# Patient Record
Sex: FEMALE | Race: WHITE | NOT HISPANIC OR LATINO | Employment: UNEMPLOYED | URBAN - METROPOLITAN AREA
[De-identification: names, ages, dates, MRNs, and addresses within clinical notes are randomized per-mention and may not be internally consistent; named-entity substitution may affect disease eponyms.]

---
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
## 2024-01-22 ENCOUNTER — OFFICE VISIT (OUTPATIENT)
Dept: OBGYN CLINIC | Facility: MEDICAL CENTER | Age: 19
End: 2024-01-22
Payer: COMMERCIAL

## 2024-01-22 VITALS
DIASTOLIC BLOOD PRESSURE: 72 MMHG | WEIGHT: 146 LBS | HEIGHT: 66 IN | HEART RATE: 88 BPM | BODY MASS INDEX: 23.46 KG/M2 | SYSTOLIC BLOOD PRESSURE: 112 MMHG

## 2024-01-22 DIAGNOSIS — M24.812 INTERNAL DERANGEMENT OF LEFT SHOULDER: Primary | ICD-10-CM

## 2024-01-22 DIAGNOSIS — S43.432D TEAR OF LEFT GLENOID LABRUM, SUBSEQUENT ENCOUNTER: ICD-10-CM

## 2024-01-22 PROCEDURE — 99214 OFFICE O/P EST MOD 30 MIN: CPT | Performed by: ORTHOPAEDIC SURGERY

## 2024-01-22 RX ORDER — TRANEXAMIC ACID 10 MG/ML
1000 INJECTION, SOLUTION INTRAVENOUS ONCE
Status: CANCELLED | OUTPATIENT
Start: 2024-02-02 | End: 2024-01-22

## 2024-01-22 RX ORDER — CHLORHEXIDINE GLUCONATE 4 G/100ML
SOLUTION TOPICAL DAILY PRN
Status: CANCELLED | OUTPATIENT
Start: 2024-01-22

## 2024-01-22 RX ORDER — CEFAZOLIN SODIUM 1 G/50ML
1000 SOLUTION INTRAVENOUS ONCE
Status: CANCELLED | OUTPATIENT
Start: 2024-02-02 | End: 2024-01-22

## 2024-01-22 RX ORDER — CHLORHEXIDINE GLUCONATE ORAL RINSE 1.2 MG/ML
15 SOLUTION DENTAL ONCE
Status: CANCELLED | OUTPATIENT
Start: 2024-01-22 | End: 2024-01-22

## 2024-01-22 NOTE — PROGRESS NOTES
[facility]IC CARE SPECIALISTS [place]  [address]  [place] [address]   ORTHOPAEDIC SURGERY OUTPATIENT NOTE  1/22/2024      HISTORY:  18 y.o. female  presents for follow up on her left shoulder. When we last saw her we recommended an MRI arthrogram to evaluate for labral tear. Her pain continues with anterior shoulder pain when pulling, no pain with overhead lifting. She did perform 6 weeks of directed PT without relief.     Past Medical History:   Diagnosis Date    Headache     Ovarian cyst        Past Surgical History:   Procedure Laterality Date    FL INJECTION LEFT SHOULDER (ARTHROGRAM)  12/27/2023    NO PAST SURGERIES      WISDOM TOOTH EXTRACTION Bilateral     bottom wisdom teeth removed       Social History     Socioeconomic History    Marital status: Single     Spouse name: Not on file    Number of children: Not on file    Years of education: Not on file    Highest education level: Not on file   Occupational History    Not on file   Tobacco Use    Smoking status: Never     Passive exposure: Never    Smokeless tobacco: Never   Vaping Use    Vaping status: Never Used   Substance and Sexual Activity    Alcohol use: Not Currently    Drug use: Never    Sexual activity: Never     Partners: Male   Other Topics Concern    Not on file   Social History Narrative    Not on file     Social Determinants of Health     Financial Resource Strain: Not on file   Food Insecurity: Not on file   Transportation Needs: Not on file   Physical Activity: Not on file   Stress: Not on file   Social Connections: Not on file   Intimate Partner Violence: Not on file   Housing Stability: Not on file       Family History   Problem Relation Age of Onset    Anxiety disorder Mother     Endometriosis Mother     Migraines Mother     Seizures Mother     Chiari malformation Mother     No Known Problems Father     Anxiety disorder Sister     Irritable bowel syndrome Sister     Diabetes Maternal  "Grandmother     Hypertension Maternal Grandfather     Diabetes Paternal Grandmother     Mental illness Neg Hx     Addiction problem Neg Hx         Patient's Medications    No medications on file       Allergies   Allergen Reactions    Procaine Rash        /72 (BP Location: Left arm, Patient Position: Sitting, Cuff Size: Standard)   Pulse 88   Ht 5' 6\" (1.676 m)   Wt 66.2 kg (146 lb)   BMI 23.57 kg/m²      REVIEW OF SYSTEMS:  Constitutional: Negative.    HEENT: Negative.    Respiratory: Negative.    Skin: Negative.    Neurological: Negative.    Psychiatric/Behavioral: Negative.  Musculoskeletal: Negative except for that mentioned in the HPI.    /72 (BP Location: Left arm, Patient Position: Sitting, Cuff Size: Standard)   Pulse 88   Ht 5' 6\" (1.676 m)   Wt 66.2 kg (146 lb)   BMI 23.57 kg/m²   Gen: No acute distress, resting comfortably in bed  HEENT: Eyes clear, moist mucus membranes, hearing intact  Respiratory: No audible wheezing or stridor  Cardiovascular: Well Perfused peripherally, 2+ distal pulse  Abdomen: nondistended, no peritoneal signs     PHYSICAL EXAM:    LEFT SHOULDER:    Forward flexion:   180 degrees   Abduction:  90 degrees   External rotation at 90 degrees abduction:   90 degrees   Internal rotation at 90 degrees abduction:  90 degrees   External rotation at 0 degrees:   70 degrees   Internal rotation: T7     STRENGTH:  Forward flexion:  5/5   Abduction:  5/5   External rotation:  5/5   Internal rotation:  5/5        Speed test: Positive  Yergason's: Negative   Tender to palpation ACJ (acromioclavicular joint): Negative   Tender to palpation LHB (long head of biceps): positive  TTP greater tuberosity: positive  Chavez test: Negative  [place] test: positive  Hornblower's: Negative  Lift off: Negative  Belly press: Negative  Bear hug: Negative  External lag sign: Negative  Cross-body adduction: Negative  Sulcus sign: Negative  Jose's test: positive  Drop arm test: " negative  Posterior load and shift: positive    Radial/median/ulnar nerve intact    <2 sec cap refill        IMAGING:  MRI arthrogram of the left shoulder was reviewed, this demonstrates contrast imbibing along the posterior labrum 8:00-9:00 suggestive of labral tear. No rotator cuff tearing.     ASSESSMENT AND PLAN:  18 y.o. female  with left shoulder pain, posterior labral tear on MRI arthrogram. Given her persistent pain and MRI imaging, we discussed surgical intervention in the form of left shoulder scope, labral repair and all necessary procedures.    The patient understands the risks and benefits of the procedure with risks including pain, stiffness, infection, neurovascular injury, recurrence of symptoms, failure of surgical procedure, inadvertent intraoperative complications, blood loss, blood clots, allergic reaction to anesthesia, stroke, heart attack, all up to and including to death. The patient understood and did consent for surgery today.     Scribe Attestation      I,:  Kenneth Pisano PA-C am acting as a scribe while in the presence of the attending physician.:       I,:  Sylvia Clayton personally performed the services described in this documentation    as scribed in my presence.:

## 2024-01-23 ENCOUNTER — TELEPHONE (OUTPATIENT)
Dept: OBGYN CLINIC | Facility: MEDICAL CENTER | Age: 19
End: 2024-01-23

## 2024-01-23 RX ORDER — MULTIVITAMIN
1 CAPSULE ORAL DAILY
COMMUNITY

## 2024-01-23 NOTE — PRE-PROCEDURE INSTRUCTIONS
Pre-Surgery Instructions:   Medication Instructions    Multiple Vitamin (multivitamin) capsule Stop taking 7 days prior to surgery.    Medication instructions for day surgery reviewed. Please use only a sip of water to take your instructed medications. Avoid all over the counter vitamins, supplements and NSAIDS for one week prior to surgery per anesthesia guidelines. Tylenol is ok to take as needed.     You will receive a call one business day prior to surgery with an arrival time and hospital directions. If your surgery is scheduled on a Monday, the hospital will be calling you on the Friday prior to your surgery. If you have not heard from anyone by 8pm, please call the hospital supervisor through the hospital  at [phone]. (Brad [phone]).    Do not eat or drink anything after midnight the night before your surgery, including candy, mints, lifesavers, or chewing gum. Do not drink alcohol 24hrs before your surgery. Try not to smoke at least 24hrs before your surgery.       Follow the pre surgery showering instructions as listed in the “My Surgical Experience Booklet” or otherwise provided by your surgeon's office. Do not use a blade to shave the surgical area 1 week before surgery. It is okay to use a clean electric clippers up to 24 hours before surgery. Do not apply any lotions, creams, including makeup, cologne, deodorant, or perfumes after showering on the day of your surgery. Do not use dry shampoo, hair spray, hair gel, or any type of hair products.     No contact lenses, eye make-up, or artificial eyelashes. Remove nail polish, including gel polish, and any artificial, gel, or acrylic nails if possible. Remove all jewelry including rings and body piercing jewelry.     Wear causal clothing that is easy to take on and off. Consider your type of surgery.    Keep any valuables, jewelry, piercings at home. Please bring any specially ordered equipment (sling, braces) if indicated.    Arrange  for a responsible person to drive you to and from the hospital on the day of your surgery. Visitor Guidelines discussed.     Call the surgeon's office with any new illnesses, exposures, or additional questions prior to surgery.    Please reference your “My Surgical Experience Booklet” for additional information to prepare for your upcoming surgery.     Patient did not receive Sling from Office

## 2024-01-23 NOTE — TELEPHONE ENCOUNTER
Called pt to advise as per Dr Clayton asked to move sx to 2/2 from 1/26. Pt was ok with change and also changed post op to 2/12

## 2024-02-01 ENCOUNTER — ANESTHESIA EVENT (OUTPATIENT)
Dept: PERIOP | Facility: HOSPITAL | Age: 19
End: 2024-02-01
Payer: COMMERCIAL

## 2024-02-01 NOTE — ANESTHESIA PREPROCEDURE EVALUATION
Procedure:  ARTHROSCOPY SHOULDER- left, labral repair and all necessary procedures (Left: Shoulder)    Relevant Problems   No relevant active problems        Physical Exam    Airway    Mallampati score: II  TM Distance: >3 FB  Neck ROM: full     Dental       Cardiovascular  Cardiovascular exam normal    Pulmonary  Pulmonary exam normal     Other Findings  post-pubertal.      Anesthesia Plan  ASA Score- 1     Anesthesia Type- general with ASA Monitors.         Additional Monitors:     Airway Plan:     Comment: + PNB  Jehovah witness - declines blood products.       Plan Factors-    Chart reviewed.   Existing labs reviewed. Patient summary reviewed.                  Induction-     Postoperative Plan-     Informed Consent- Anesthetic plan and risks discussed with patient.  I personally reviewed this patient with the CRNA. Discussed and agreed on the Anesthesia Plan with the CRNA..

## 2024-02-02 ENCOUNTER — ANESTHESIA (OUTPATIENT)
Dept: PERIOP | Facility: HOSPITAL | Age: 19
End: 2024-02-02
Payer: COMMERCIAL

## 2024-02-02 ENCOUNTER — HOSPITAL ENCOUNTER (OUTPATIENT)
Facility: HOSPITAL | Age: 19
Setting detail: OUTPATIENT SURGERY
Discharge: HOME | End: 2024-02-02
Attending: ORTHOPAEDIC SURGERY | Admitting: ORTHOPAEDIC SURGERY
Payer: COMMERCIAL

## 2024-02-02 VITALS
HEART RATE: 85 BPM | OXYGEN SATURATION: 93 % | SYSTOLIC BLOOD PRESSURE: 131 MMHG | HEIGHT: 66 IN | DIASTOLIC BLOOD PRESSURE: 60 MMHG | TEMPERATURE: 96.8 F | RESPIRATION RATE: 18 BRPM | BODY MASS INDEX: 22.93 KG/M2 | WEIGHT: 142.7 LBS

## 2024-02-02 DIAGNOSIS — S43.432A TEAR OF LEFT GLENOID LABRUM, INITIAL ENCOUNTER: Primary | ICD-10-CM

## 2024-02-02 LAB — B-HCG SERPL-ACNC: <1 MIU/ML (ref 0–5)

## 2024-02-02 PROCEDURE — 84702 CHORIONIC GONADOTROPIN TEST: CPT | Performed by: ANESTHESIOLOGY

## 2024-02-02 PROCEDURE — C9290 INJ, BUPIVACAINE LIPOSOME: HCPCS | Performed by: ANESTHESIOLOGY

## 2024-02-02 PROCEDURE — 29805 SHO ARTHRS DX +- SYNOVIAL BX: CPT | Performed by: ORTHOPAEDIC SURGERY

## 2024-02-02 PROCEDURE — 29805 SHO ARTHRS DX +- SYNOVIAL BX: CPT | Performed by: PHYSICIAN ASSISTANT

## 2024-02-02 PROCEDURE — NC001 PR NO CHARGE: Performed by: PHYSICIAN ASSISTANT

## 2024-02-02 RX ORDER — SODIUM CHLORIDE, SODIUM LACTATE, POTASSIUM CHLORIDE, CALCIUM CHLORIDE 600; 310; 30; 20 MG/100ML; MG/100ML; MG/100ML; MG/100ML
INJECTION, SOLUTION INTRAVENOUS CONTINUOUS PRN
Status: DISCONTINUED | OUTPATIENT
Start: 2024-02-02 | End: 2024-02-02

## 2024-02-02 RX ORDER — CHLORHEXIDINE GLUCONATE ORAL RINSE 1.2 MG/ML
15 SOLUTION DENTAL ONCE
Status: COMPLETED | OUTPATIENT
Start: 2024-02-02 | End: 2024-02-02

## 2024-02-02 RX ORDER — ONDANSETRON 2 MG/ML
INJECTION INTRAMUSCULAR; INTRAVENOUS AS NEEDED
Status: DISCONTINUED | OUTPATIENT
Start: 2024-02-02 | End: 2024-02-02

## 2024-02-02 RX ORDER — NEOSTIGMINE METHYLSULFATE 1 MG/ML
INJECTION INTRAVENOUS AS NEEDED
Status: DISCONTINUED | OUTPATIENT
Start: 2024-02-02 | End: 2024-02-02

## 2024-02-02 RX ORDER — ONDANSETRON 2 MG/ML
4 INJECTION INTRAMUSCULAR; INTRAVENOUS ONCE AS NEEDED
Status: COMPLETED | OUTPATIENT
Start: 2024-02-02 | End: 2024-02-02

## 2024-02-02 RX ORDER — BUPIVACAINE HYDROCHLORIDE 5 MG/ML
INJECTION, SOLUTION EPIDURAL; INTRACAUDAL AS NEEDED
Status: DISCONTINUED | OUTPATIENT
Start: 2024-02-02 | End: 2024-02-02

## 2024-02-02 RX ORDER — FENTANYL CITRATE 50 UG/ML
INJECTION, SOLUTION INTRAMUSCULAR; INTRAVENOUS AS NEEDED
Status: DISCONTINUED | OUTPATIENT
Start: 2024-02-02 | End: 2024-02-02

## 2024-02-02 RX ORDER — CHLORHEXIDINE GLUCONATE 4 G/100ML
SOLUTION TOPICAL DAILY PRN
Status: DISCONTINUED | OUTPATIENT
Start: 2024-02-02 | End: 2024-02-02 | Stop reason: HOSPADM

## 2024-02-02 RX ORDER — ROCURONIUM BROMIDE 10 MG/ML
INJECTION, SOLUTION INTRAVENOUS AS NEEDED
Status: DISCONTINUED | OUTPATIENT
Start: 2024-02-02 | End: 2024-02-02

## 2024-02-02 RX ORDER — TRANEXAMIC ACID 10 MG/ML
1000 INJECTION, SOLUTION INTRAVENOUS ONCE
Status: COMPLETED | OUTPATIENT
Start: 2024-02-02 | End: 2024-02-02

## 2024-02-02 RX ORDER — MAGNESIUM HYDROXIDE 1200 MG/15ML
LIQUID ORAL AS NEEDED
Status: DISCONTINUED | OUTPATIENT
Start: 2024-02-02 | End: 2024-02-02 | Stop reason: HOSPADM

## 2024-02-02 RX ORDER — GLYCOPYRROLATE 0.2 MG/ML
INJECTION INTRAMUSCULAR; INTRAVENOUS AS NEEDED
Status: DISCONTINUED | OUTPATIENT
Start: 2024-02-02 | End: 2024-02-02

## 2024-02-02 RX ORDER — PROMETHAZINE HYDROCHLORIDE 25 MG/ML
25 INJECTION, SOLUTION INTRAMUSCULAR; INTRAVENOUS ONCE AS NEEDED
Status: CANCELLED | OUTPATIENT
Start: 2024-02-02

## 2024-02-02 RX ORDER — FENTANYL CITRATE/PF 50 MCG/ML
25 SYRINGE (ML) INJECTION
Status: DISCONTINUED | OUTPATIENT
Start: 2024-02-02 | End: 2024-02-02 | Stop reason: HOSPADM

## 2024-02-02 RX ORDER — CEFAZOLIN SODIUM 1 G/50ML
1000 SOLUTION INTRAVENOUS ONCE
Status: COMPLETED | OUTPATIENT
Start: 2024-02-02 | End: 2024-02-02

## 2024-02-02 RX ORDER — MIDAZOLAM HYDROCHLORIDE 2 MG/2ML
INJECTION, SOLUTION INTRAMUSCULAR; INTRAVENOUS AS NEEDED
Status: DISCONTINUED | OUTPATIENT
Start: 2024-02-02 | End: 2024-02-02

## 2024-02-02 RX ORDER — LIDOCAINE HYDROCHLORIDE 10 MG/ML
INJECTION, SOLUTION EPIDURAL; INFILTRATION; INTRACAUDAL; PERINEURAL AS NEEDED
Status: DISCONTINUED | OUTPATIENT
Start: 2024-02-02 | End: 2024-02-02

## 2024-02-02 RX ORDER — OXYCODONE HYDROCHLORIDE 5 MG/1
5 TABLET ORAL EVERY 4 HOURS PRN
Qty: 30 TABLET | Refills: 0 | Status: SHIPPED | OUTPATIENT
Start: 2024-02-02 | End: 2024-02-07

## 2024-02-02 RX ORDER — PENICILLIN V POTASSIUM 500 MG/1
500 TABLET ORAL EVERY 6 HOURS SCHEDULED
Qty: 8 TABLET | Refills: 0 | Status: SHIPPED | OUTPATIENT
Start: 2024-02-02 | End: 2024-02-04

## 2024-02-02 RX ORDER — PROPOFOL 10 MG/ML
INJECTION, EMULSION INTRAVENOUS AS NEEDED
Status: DISCONTINUED | OUTPATIENT
Start: 2024-02-02 | End: 2024-02-02

## 2024-02-02 RX ORDER — DEXAMETHASONE SODIUM PHOSPHATE 10 MG/ML
INJECTION, SOLUTION INTRAMUSCULAR; INTRAVENOUS AS NEEDED
Status: DISCONTINUED | OUTPATIENT
Start: 2024-02-02 | End: 2024-02-02

## 2024-02-02 RX ADMIN — SODIUM CHLORIDE, SODIUM LACTATE, POTASSIUM CHLORIDE, AND CALCIUM CHLORIDE: .6; .31; .03; .02 INJECTION, SOLUTION INTRAVENOUS at 07:44

## 2024-02-02 RX ADMIN — CEFAZOLIN SODIUM 1000 MG: 1 SOLUTION INTRAVENOUS at 08:08

## 2024-02-02 RX ADMIN — DEXAMETHASONE SODIUM PHOSPHATE 10 MG: 10 INJECTION INTRAMUSCULAR; INTRAVENOUS at 08:21

## 2024-02-02 RX ADMIN — PROPOFOL 200 MG: 10 INJECTION, EMULSION INTRAVENOUS at 08:12

## 2024-02-02 RX ADMIN — SODIUM CHLORIDE 8 MCG: 9 INJECTION, SOLUTION INTRAVENOUS at 08:20

## 2024-02-02 RX ADMIN — FENTANYL CITRATE 50 MCG: 50 INJECTION INTRAMUSCULAR; INTRAVENOUS at 08:02

## 2024-02-02 RX ADMIN — CHLORHEXIDINE GLUCONATE 0.12% ORAL RINSE 15 ML: 1.2 LIQUID ORAL at 07:06

## 2024-02-02 RX ADMIN — TRANEXAMIC ACID 1000 MG: 10 INJECTION, SOLUTION INTRAVENOUS at 08:15

## 2024-02-02 RX ADMIN — BUPIVACAINE HYDROCHLORIDE 10 ML: 5 INJECTION, SOLUTION EPIDURAL; INTRACAUDAL; PERINEURAL at 08:07

## 2024-02-02 RX ADMIN — BUPIVACAINE 20 ML: 13.3 INJECTION, SUSPENSION, LIPOSOMAL INFILTRATION at 08:07

## 2024-02-02 RX ADMIN — FENTANYL CITRATE 50 MCG: 50 INJECTION INTRAMUSCULAR; INTRAVENOUS at 08:29

## 2024-02-02 RX ADMIN — MIDAZOLAM 2 MG: 1 INJECTION INTRAMUSCULAR; INTRAVENOUS at 08:02

## 2024-02-02 RX ADMIN — NEOSTIGMINE METHYLSULFATE 3 MG: 1 INJECTION INTRAVENOUS at 08:58

## 2024-02-02 RX ADMIN — ROCURONIUM BROMIDE 40 MG: 10 INJECTION, SOLUTION INTRAVENOUS at 08:12

## 2024-02-02 RX ADMIN — SODIUM CHLORIDE 12 MCG: 9 INJECTION, SOLUTION INTRAVENOUS at 08:02

## 2024-02-02 RX ADMIN — ONDANSETRON 4 MG: 2 INJECTION INTRAMUSCULAR; INTRAVENOUS at 08:58

## 2024-02-02 RX ADMIN — LIDOCAINE HYDROCHLORIDE 50 MG: 10 INJECTION, SOLUTION EPIDURAL; INFILTRATION; INTRACAUDAL; PERINEURAL at 08:12

## 2024-02-02 RX ADMIN — ONDANSETRON 4 MG: 2 INJECTION INTRAMUSCULAR; INTRAVENOUS at 09:40

## 2024-02-02 RX ADMIN — GLYCOPYRROLATE 0.6 MG: 0.2 INJECTION, SOLUTION INTRAMUSCULAR; INTRAVENOUS at 08:58

## 2024-02-02 RX ADMIN — SODIUM CHLORIDE 4 MCG: 9 INJECTION, SOLUTION INTRAVENOUS at 08:12

## 2024-02-02 NOTE — OP NOTE
OPERATIVE REPORT  PATIENT NAME: Betsy Vivar    :  2005  MRN: [id number]  Pt Location: [facility] OR ROOM 01    SURGERY DATE: 2024    Surgeons and Role:     * Sylvia Clayton,  - Primary     * Kenneth Pisano PA-C - Assisting    Preop Diagnosis:  Tear of left glenoid labrum, subsequent encounter [S42.717N]    Post-Op Diagnosis Codes:     * Left shoulder capsular synovitis    Procedure(s):  Left - ARTHROSCOPY SHOULDER- left Diagnostic    Specimen(s):  * No specimens in log *    Estimated Blood Loss:   Minimal    Drains:  * No LDAs found *    Anesthesia Type:   General w/ Interscalene Block    Operative Indications:  Tear of left glenoid labrum, subsequent encounter [S44.548S]      Operative Findings:  Mild capsular synovitis     Complications:   None    Procedure and Technique:  The patient was seen in the preoperative holding area where the operative extremity was marked.  She was taken to the operating room and placed in the lateral decubitus position.  Her left upper extremity was prepped and draped in usual sterile fashion.  A timeout was called and patient administered Ancef 1 g IV prior to incision.  Also prior to incision an evaluation under anesthesia demonstrated no signs of instability and no stiffness of the shoulder.  Using an 11 blade knife a posterior portal was made.  The arthroscope was placed into the glenohumeral joint.  The glenohumeral cartilage was grossly normal.  An anterior portal was established.  Probing of the anterior labrum demonstrated no tear.  The subscapularis tendon was normal.  The long head biceps tendon was normal.  The biceps tendon was normal.  The biceps anchor showed no tear.  There was mild synovitis of the rotator interval.  The arthroscope was then placed into the anterior portal and the posterior labrum was evaluated.  Probing of the posterior labrum demonstrated a no tear of the labrum.  Overall the evaluation of the shoulder was normal and no repair was required or  debridement.  The portal holes were closed with 3-0 nylon.  Dressings included Mepilex dressing.  Patient placed in regular sling.  There were no complications that the case.   I was present for the entire procedure., A qualified resident physician was not available., and A physician assistant was required during the procedure for retraction, tissue handling, dissection and suturing.    Patient Disposition:  PACU         SIGNATURE: Sylvia Clayton DO  DATE: February 2, 2024  TIME: 9:11 AM

## 2024-02-02 NOTE — DISCHARGE INSTR - AVS FIRST PAGE
Sylvia Clayton DO    Orthopedic Surgery, Shoulder/Elbow and Sports Medicine  [address]. [address] [place] [address], [place] [address]  Phone: [phone]    General Post-op Surgical Instructions:    Date of Procedure - 02/02/24     Procedure - Left diagnostic shoulder arthroscopy    Weight Bearing Status - no heavy lifting left arm, may wear sling for comfort with gentle range of motion once the block wears off    DVT Prophylaxis and Duration - not required    PT/OT Instructions - will be discussed at first post-op    Stitches/Staples - Will be removed at 7-10 days postop; we will then place steristrips on incision site    Wound Care - maintain dressing, keep clean and dry    Xray follow up - to be done at or prior to office visit follow-up if indicated    Additional Info - Please complete your course of antibiotics     Any questions or concerns please call [phone] please!

## 2024-02-02 NOTE — ANESTHESIA POSTPROCEDURE EVALUATION
Post-Op Assessment Note    CV Status:  Stable  Pain Score: 0    Pain management: adequate       Mental Status:  Alert and awake   Hydration Status:  Euvolemic   PONV Controlled:  Controlled   Airway Patency:  Patent     Post Op Vitals Reviewed: Yes    No anethesia notable event occurred.    Staff: CRNA               /51 (02/02/24 0917)    Temp 97.7 °F (36.5 °C) (02/02/24 0917)    Pulse 101 (02/02/24 0917)   Resp 14 (02/02/24 0917)    SpO2 97 % (02/02/24 0917)

## 2024-02-02 NOTE — ANESTHESIA PROCEDURE NOTES
Peripheral Block    Patient location during procedure: holding area  Start time: 2/2/2024 8:07 AM  Reason for block: at surgeon's request and post-op pain management  Staffing  Performed by: Bob Irizarry MD  Authorized by: Bob Irizarry MD    Preanesthetic Checklist  Completed: patient identified, IV checked, site marked, risks and benefits discussed, surgical consent, monitors and equipment checked, pre-op evaluation and timeout performed  Peripheral Block  Patient position: sitting  Prep: ChloraPrep  Patient monitoring: frequent blood pressure checks, continuous pulse oximetry and heart rate  Block type: Interscalene  Laterality: left  Injection technique: single-shot  Procedures: ultrasound guided, Ultrasound guidance required for the procedure to increase accuracy and safety of medication placement and decrease risk of complications.  Ultrasound permanent image saved  Needle  Needle type: Stimuplex   Needle gauge: 20 G  Needle length: 4 in  Needle localization: anatomical landmarks and ultrasound guidance  Assessment  Injection assessment: incremental injection, frequent aspiration, injected with ease, negative aspiration, negative for heart rate change, no paresthesia on injection, no symptoms of intraneural/intravenous injection and needle tip visualized at all times  Paresthesia pain: none  Post-procedure:  site cleaned  patient tolerated the procedure well with no immediate complications

## 2024-02-02 NOTE — H&P
"Patient Name:  Betsy Vivar  MRN:  [id number]      Assessment & Plan     Left shoulder labral tear  Plan for left shoulder scope,labral repair and all necessary procedures    Chief Complaint     Left shoulder pain.     History of the Present Illness     Betsy Vivar is a 18 y.o. female with left shoulder pain.  Patient states she has been having left shoulder pain off and on for few years.  She states initially she injured her shoulder at the gym.  She states that that she rested and the pain went away.  Patient states she does ask throwing and notes some pain in the left shoulder.  She states a week ago she was doing lateral raises and felt a pop over the anterior aspect anterolateral aspect of the left shoulder. She notes 3 days ago she was working out and felt pop over anterior left shoulder.  She states mostly she has pain with internal rotation and forward flexion and when she works out. She works out 6 days a week.        Physical Exam     /77   Pulse 95   Temp 97.6 °F (36.4 °C) (Temporal)   Resp 16   Ht 5' 6\" (1.676 m)   Wt 64.7 kg (142 lb 11.2 oz)   LMP 01/30/2024   SpO2 100%   BMI 23.03 kg/m²     LEFT SHOULDER:     NVI axillary/medial/radial/ulnar and sensory intact     Forward flexion:   180 degrees   Abduction:  180 degrees   External rotation at 90 degrees abduction:   90 degrees   Internal rotation at 90 degrees abduction:  90 degrees   External rotation at 0 degrees:   70 degrees   Internal rotation: T7     STRENGTH:  Forward flexion:  5/5   Abduction:  5/5   External rotation:  5/5   Internal rotation:  5/5        Speed test: Positive   Yergason's: Negative   Tender to palpation ACJ (acromioclavicular joint): Negative   Tender to palpation LHB (long head of biceps): Positive   TTP greater tuberosity: Positive   Chavez test: Negative  [place] test: Positive   Hornblower's: Negative  Lift off: Negative  Belly press: Negative  Bear hug: Negative  External lag sign: " Negative  Cross-body adduction: Negative  Sulcus sign: Negative  Jose's test: Positive   Drop arm test: negative    Eyes:  Anicteric sclerae.  ENT:  Trachea midline.  Lungs:  Clear to auscultation bilaterally.  Heart:  Regular rate and rhythm with audible S1 and S2.  Abdomen:  Soft and nontender.  Lymph:  No palpable lymphadenopathy.  Skin:  Intact without erythema.  Neuro:  Sensation grossly intact to light touch.  Psych:  Mood and affect are appropriate.    Data Review     I have personally reviewed pertinent films in PACS, and my interpretation follows.    Focal posterior contrast imbibition extending from the 8:00 through 9:00 positions consistent with focal labral tear. The remainder of labrum appears intact. Otherwise unremarkable study.       I have personally reviewed pertinent lab results.    Lab Results   Component Value Date    K 3.9 08/09/2018    CL 99 (L) 08/09/2018    CO2 28 08/09/2018    BUN 16 08/09/2018    CREATININE 0.83 08/09/2018     Lab Results   Component Value Date    WBC 10.27 08/09/2018    HGB 14.0 08/09/2018     08/09/2018     Lab Results   Component Value Date    INR 0.95 02/16/2017    PTT 33 02/16/2017       Past Medical History:   Diagnosis Date    Headache     Ovarian cyst        Past Surgical History:   Procedure Laterality Date    FL INJECTION LEFT SHOULDER (ARTHROGRAM)  12/27/2023    WISDOM TOOTH EXTRACTION Bilateral     bottom wisdom teeth removed       Allergies   Allergen Reactions    Procaine Rash       No current facility-administered medications on file prior to encounter.     Current Outpatient Medications on File Prior to Encounter   Medication Sig Dispense Refill    Multiple Vitamin (multivitamin) capsule Take 1 capsule by mouth daily         Social History     Tobacco Use    Smoking status: Never     Passive exposure: Never    Smokeless tobacco: Never   Vaping Use    Vaping status: Never Used   Substance Use Topics    Alcohol use: Not Currently    Drug use: Never        Family History   Problem Relation Age of Onset    Anxiety disorder Mother     Endometriosis Mother     Migraines Mother     Seizures Mother     Chiari malformation Mother     No Known Problems Father     Anxiety disorder Sister     Irritable bowel syndrome Sister     Diabetes Maternal Grandmother     Hypertension Maternal Grandfather     Diabetes Paternal Grandmother     Mental illness Neg Hx     Addiction problem Neg Hx        Review of Systems     As stated in the HPI. All other systems were reviewed and are negative.

## 2024-02-06 ENCOUNTER — OFFICE VISIT (OUTPATIENT)
Dept: FAMILY MEDICINE CLINIC | Facility: CLINIC | Age: 19
End: 2024-02-06
Payer: COMMERCIAL

## 2024-02-06 VITALS
WEIGHT: 144 LBS | OXYGEN SATURATION: 98 % | DIASTOLIC BLOOD PRESSURE: 70 MMHG | HEIGHT: 66 IN | SYSTOLIC BLOOD PRESSURE: 120 MMHG | BODY MASS INDEX: 23.14 KG/M2 | TEMPERATURE: 96.3 F | HEART RATE: 99 BPM | RESPIRATION RATE: 18 BRPM

## 2024-02-06 DIAGNOSIS — Z13.1 SCREENING FOR DIABETES MELLITUS: ICD-10-CM

## 2024-02-06 DIAGNOSIS — Z13.220 SCREENING, LIPID: ICD-10-CM

## 2024-02-06 DIAGNOSIS — Z13.0 SCREENING, ANEMIA, DEFICIENCY, IRON: ICD-10-CM

## 2024-02-06 DIAGNOSIS — Z13.29 SCREENING FOR THYROID DISORDER: ICD-10-CM

## 2024-02-06 DIAGNOSIS — Z00.00 ANNUAL PHYSICAL EXAM: Primary | ICD-10-CM

## 2024-02-06 PROCEDURE — 99395 PREV VISIT EST AGE 18-39: CPT | Performed by: NURSE PRACTITIONER

## 2024-02-06 NOTE — PROGRESS NOTES
ADULT ANNUAL PHYSICAL  [facility]    NAME: Besty Vivar  AGE: 18 y.o. SEX: female  : 2005     DATE: 2024     Assessment and Plan:     Problem List Items Addressed This Visit    None  Visit Diagnoses       Annual physical exam    -  Primary    Screening, lipid        Relevant Orders    Comprehensive metabolic panel    CBC and differential    TSH, 3rd generation with Free T4 reflex    Lipid Panel with Direct LDL reflex    Screening for thyroid disorder        Relevant Orders    Comprehensive metabolic panel    CBC and differential    TSH, 3rd generation with Free T4 reflex    Lipid Panel with Direct LDL reflex    Screening for diabetes mellitus        Relevant Orders    Comprehensive metabolic panel    CBC and differential    TSH, 3rd generation with Free T4 reflex    Lipid Panel with Direct LDL reflex    Screening, anemia, deficiency, iron        Relevant Orders    Comprehensive metabolic panel    CBC and differential    TSH, 3rd generation with Free T4 reflex    Lipid Panel with Direct LDL reflex              Immunizations and preventive care screenings were discussed with patient today. Appropriate education was printed on patient's after visit summary.    Counseling:  Alcohol/drug use: discussed moderation in alcohol intake, the recommendations for healthy alcohol use, and avoidance of illicit drug use.  Dental Health: discussed importance of regular tooth brushing, flossing, and dental visits.  Injury prevention: discussed safety/seat belts, safety helmets, smoke detectors, carbon dioxide detectors, and smoking near bedding or upholstery.  Sexual health: discussed sexually transmitted diseases, partner selection, use of condoms, avoidance of unintended pregnancy, and contraceptive alternatives.  Exercise: the importance of regular exercise/physical activity was discussed. Recommend exercise 3-5 times per week for at least 30 minutes.        Depression Screening and Follow-up Plan: Patient was screened for depression during today's encounter. They screened negative with a PHQ-2 score of 0.        Return in about 1 year (around 2/6/2025) for Annual physical.     Chief Complaint:     Chief Complaint   Patient presents with    Physical Exam     Pt been seeing for Physical Exam      History of Present Illness:     Adult Annual Physical   Patient here for a comprehensive physical exam. The patient reports no problems.    Diet and Physical Activity  Diet/Nutrition: well balanced diet.   Exercise: 3-4 times a week on average.      Depression Screening  PHQ-2/9 Depression Screening    Little interest or pleasure in doing things: 0 - not at all  Feeling down, depressed, or hopeless: 0 - not at all  PHQ-2 Score: 0  PHQ-2 Interpretation: Negative depression screen       General Health  Sleep: sleeps well.   Hearing: normal - bilateral.  Vision: no vision problems.   Dental: regular dental visits.       /GYN Health  Follows with gynecology? no   Last menstrual period: 1/2024  Contraceptive method:  none .  History of STDs?: no.     Advanced Care Planning  Do you have an advanced directive? no  Do you have a durable medical power of ? no     Review of Systems:     Review of Systems   Constitutional:  Negative for fatigue and fever.   HENT:  Negative for congestion, postnasal drip and rhinorrhea.    Eyes:  Negative for photophobia and visual disturbance.   Respiratory:  Negative for cough, shortness of breath and wheezing.    Cardiovascular:  Negative for chest pain and palpitations.   Gastrointestinal:  Negative for constipation, diarrhea, nausea and vomiting.   Genitourinary:  Negative for dysuria and frequency.   Musculoskeletal:  Negative for arthralgias and myalgias.   Skin:  Negative for rash.   Neurological:  Negative for dizziness, light-headedness and headaches.   Hematological:  Negative for adenopathy.   Psychiatric/Behavioral:  Negative  for dysphoric mood and sleep disturbance. The patient is not nervous/anxious.       Past Medical History:     Past Medical History:   Diagnosis Date    Headache     Ovarian cyst       Past Surgical History:     Past Surgical History:   Procedure Laterality Date    FL INJECTION LEFT SHOULDER (ARTHROGRAM)  12/27/2023    MO SURGICAL ARTHROSCOPY SHOULDER REPAIR SLAP LESION Left 2/2/2024    Procedure: ARTHROSCOPY SHOULDER- left Diagnostic;  Surgeon: Sylvia Clayton DO;  Location:  MAIN OR;  Service: Orthopedics    WISDOM TOOTH EXTRACTION Bilateral     bottom wisdom teeth removed      Social History:     Social History     Socioeconomic History    Marital status: Single     Spouse name: None    Number of children: None    Years of education: None    Highest education level: None   Occupational History    None   Tobacco Use    Smoking status: Never     Passive exposure: Never    Smokeless tobacco: Never   Vaping Use    Vaping status: Never Used   Substance and Sexual Activity    Alcohol use: Not Currently    Drug use: Never    Sexual activity: Never     Partners: Male   Other Topics Concern    None   Social History Narrative    None     Social Determinants of Health     Financial Resource Strain: Not on file   Food Insecurity: Not on file   Transportation Needs: Not on file   Physical Activity: Not on file   Stress: Not on file   Social Connections: Not on file   Intimate Partner Violence: Not on file   Housing Stability: Not on file      Family History:     Family History   Problem Relation Age of Onset    Anxiety disorder Mother     Endometriosis Mother     Migraines Mother     Seizures Mother     Chiari malformation Mother     No Known Problems Father     Anxiety disorder Sister     Irritable bowel syndrome Sister     Diabetes Maternal Grandmother     Hypertension Maternal Grandfather     Diabetes Paternal Grandmother     Mental illness Neg Hx     Addiction problem Neg Hx       Current Medications:     Current  "Outpatient Medications   Medication Sig Dispense Refill    Multiple Vitamin (multivitamin) capsule Take 1 capsule by mouth daily      oxyCODONE (Roxicodone) 5 immediate release tablet Take 1 tablet (5 mg total) by mouth every 4 (four) hours as needed for moderate pain for up to 5 days Max Daily Amount: 30 mg 30 tablet 0     No current facility-administered medications for this visit.      Allergies:     Allergies   Allergen Reactions    Procaine Rash      Physical Exam:     /70 (BP Location: Right arm, Patient Position: Sitting, Cuff Size: Standard)   Pulse 99   Temp (!) 96.3 °F (35.7 °C) (Tympanic)   Resp 18   Ht 5' 6.06\" (1.678 m)   Wt 65.3 kg (144 lb)   LMP 01/30/2024   SpO2 98%   BMI 23.20 kg/m²     Physical Exam  Vitals and nursing note reviewed.   Constitutional:       Appearance: Normal appearance.   HENT:      Head: Normocephalic and atraumatic.      Right Ear: Tympanic membrane, ear canal and external ear normal.      Left Ear: Tympanic membrane, ear canal and external ear normal.      Nose: Nose normal.      Mouth/Throat:      Mouth: Mucous membranes are moist.   Eyes:      Conjunctiva/sclera: Conjunctivae normal.   Cardiovascular:      Rate and Rhythm: Normal rate and regular rhythm.      Heart sounds: Normal heart sounds.   Pulmonary:      Effort: Pulmonary effort is normal.      Breath sounds: Normal breath sounds.   Abdominal:      General: Bowel sounds are normal.   Musculoskeletal:         General: Normal range of motion.      Cervical back: Normal range of motion and neck supple.   Skin:     General: Skin is warm.      Capillary Refill: Capillary refill takes less than 2 seconds.   Neurological:      General: No focal deficit present.      Mental Status: She is alert and oriented to person, place, and time.   Psychiatric:         Mood and Affect: Mood normal.         Behavior: Behavior normal.         Thought Content: Thought content normal.         Judgment: Judgment normal.      "     WENDI Whitlock SUMANTH [facility]

## 2024-02-12 ENCOUNTER — APPOINTMENT (OUTPATIENT)
Dept: LAB | Facility: MEDICAL CENTER | Age: 19
End: 2024-02-12
Payer: COMMERCIAL

## 2024-02-12 ENCOUNTER — OFFICE VISIT (OUTPATIENT)
Dept: OBGYN CLINIC | Facility: MEDICAL CENTER | Age: 19
End: 2024-02-12

## 2024-02-12 VITALS
HEART RATE: 85 BPM | SYSTOLIC BLOOD PRESSURE: 114 MMHG | HEIGHT: 66 IN | WEIGHT: 143.8 LBS | DIASTOLIC BLOOD PRESSURE: 70 MMHG | BODY MASS INDEX: 23.11 KG/M2

## 2024-02-12 DIAGNOSIS — Z13.0 SCREENING, ANEMIA, DEFICIENCY, IRON: ICD-10-CM

## 2024-02-12 DIAGNOSIS — Z09 POSTOP CHECK: Primary | ICD-10-CM

## 2024-02-12 DIAGNOSIS — Z13.220 SCREENING, LIPID: ICD-10-CM

## 2024-02-12 DIAGNOSIS — Z13.1 SCREENING FOR DIABETES MELLITUS: Primary | ICD-10-CM

## 2024-02-12 DIAGNOSIS — Z13.29 SCREENING FOR THYROID DISORDER: ICD-10-CM

## 2024-02-12 DIAGNOSIS — Z13.1 SCREENING FOR DIABETES MELLITUS: ICD-10-CM

## 2024-02-12 PROCEDURE — 99024 POSTOP FOLLOW-UP VISIT: CPT | Performed by: ORTHOPAEDIC SURGERY

## 2024-02-12 NOTE — PROGRESS NOTES
"  Procedure:     Diagnostic shoulder arthroscopy, Left shoulder       Date of surgery:   02/02/2024      PLAN  Weight bearing to tolerance  Begin PT to tolerance stretching first, no repair or debridement done so no formal restrictions  Continue tylenol/motrin, patient still requiring one oxycodone at night but is fine during the day. She can call for new script if required    History of Present Illness:   Chief Complaint:   Chief Complaint   Patient presents with   • Left Shoulder - Post-op     Betsy Vivar is a 18 y.o. female who is being seen for post-operative visit for the above procedure. Pain is well controlled and the patient has successfully transitioned to OTC pain medicines.        Review of Systems:  General- denies fever/chills  Respiratory- denies cough or SOB  Cardio- denies chest pain or palpitations  GI- denies abdominal pain  Musculoskeletal- Negative except noted above  Skin- denies rashes or wounds    Physical Exam:   /70 (BP Location: Right arm, Patient Position: Sitting, Cuff Size: Standard)   Pulse 85   Ht 5' 6\" (1.676 m)   Wt 65.2 kg (143 lb 12.8 oz)   LMP 01/30/2024   BMI 23.21 kg/m²   General/Constitutional: No apparent distress: well-nourished and well developed.  Eyes: normal ocular motion  Lymphatic: No appreciable lymphadenopathy  Respiratory: Non-labored breathing  Vascular: No edema, swelling or tenderness, except as noted in detailed exam.  Integumentary: No impressive skin lesions present, except as noted in detailed exam.  Neuro: No ataxia or tremors noted  Psych: Normal mood and affect, oriented to person, place and time. Appropriate affect.  Musculoskeletal: Normal, except as noted in detailed exam and in HPI.    Examination    left        Incision Clean, dry, intact  Sutures Removed this visit    Motion          Sensation Intact to light touch throughout axillary,musculocutaneous,radial, unlar and median nerve distributions.    Cardiovascular Brisk capillary " refill < 2 seconds    Special Tests None      Imaging Studies:   No new imaging    Scribe Attestation    I,:   am acting as a scribe while in the presence of the attending physician.:       I,:   personally performed the services described in this documentation    as scribed in my presence.:

## 2024-02-14 ENCOUNTER — EVALUATION (OUTPATIENT)
Dept: PHYSICAL THERAPY | Facility: MEDICAL CENTER | Age: 19
End: 2024-02-14
Payer: COMMERCIAL

## 2024-02-14 DIAGNOSIS — M25.512 LEFT SHOULDER PAIN, UNSPECIFIED CHRONICITY: Primary | ICD-10-CM

## 2024-02-14 DIAGNOSIS — Z09 POSTOP CHECK: ICD-10-CM

## 2024-02-14 DIAGNOSIS — Z47.89 ORTHOPEDIC AFTERCARE: ICD-10-CM

## 2024-02-14 PROCEDURE — 97161 PT EVAL LOW COMPLEX 20 MIN: CPT | Performed by: PHYSICAL THERAPIST

## 2024-02-14 PROCEDURE — 97110 THERAPEUTIC EXERCISES: CPT | Performed by: PHYSICAL THERAPIST

## 2024-02-14 NOTE — PROGRESS NOTES
PT Evaluation     Today's date: 2024  Patient name: Betsy Vivar  : 2005  MRN: [id number]  Referring provider: Jeffrey Lucia MD  Dx:   Encounter Diagnosis     ICD-10-CM    1. Left shoulder pain, unspecified chronicity  M25.512       2. Postop check  Z09 Ambulatory Referral to Physical Therapy      3. Orthopedic aftercare  Z47.89                      Assessment  Assessment details: Betsy Vivar is a 18 y.o. female who presents with Left shoulder pain, unspecified chronicity  (primary encounter diagnosis)  Postop check  Orthopedic aftercare.  Patient presents alert and oriented with the above impairments. . Betsy will benefit from PT to addres deficits in order to maximize and return to prior level of function.  No further referral appears necessary at this time based upon examination results.  Prognosis is good given HEP compliance. Please contact me if you have any questions or recommendations.     Impairments: abnormal or restricted ROM, abnormal movement, activity intolerance, impaired physical strength, lacks appropriate home exercise program and pain with function  Understanding of Dx/Px/POC: good   Prognosis: good    Goals  Short Term Goals:   1. Pain decreased 2 ratings in 4 weeks  2.  ROM increased 10* in 4 weeks  3.  Strength increased 1/2 grade in 4 weeks    Long Term Goals:   1.  ADLS/IADLS in related activities improved to maximal level in 8 weeks  2.  Work performance improved to maximal level in 8 weeks  3.  Recreational activities are improved to maximal level in 8 weeks.  4.  [place] with HEP in 8 weeks.     Plan  Patient would benefit from: skilled physical therapy  Planned therapy interventions: IASTM, manual therapy, therapeutic exercise, stretching, strengthening, patient education, neuromuscular re-education, functional ROM exercises, flexibility and home exercise program  Frequency: 2x week  Duration in weeks: 8  Plan of Care beginning date: 2024  Plan of Care  "expiration date: 2024  Treatment plan discussed with: patient        Subjective Evaluation    History of Present Illness  Mechanism of injury: Pt reports onset of L shoulder pain about 2 years ago.  She attended a few visits of PT which did not provide any relief.  She returned to ortho and was sent for arthrogram which revealed possible labral tear.  She underwent surgery on  which revealed no tearing and no abnormality in the shoulder.  She returned to ortho last week for post op visit and was referred to PT.  She presents today w/ reports of post op discomfort that is constant.  Pain worsens w/ any usage or movement.  Pain is located mostly posteriorly.  Pre-op pain is described as pinching in posterior shoulder w/ intermittent popping w/ lateral movement. Pain was radiating into her elbow at times.  Prior to surgery she was working out 4 days/week.    Patient Goals  Patient goals for therapy: decreased pain, increased motion, increased strength, independence with ADLs/IADLs, return to sport/leisure activities and return to work    Pain  At best pain ratin  At worst pain ratin          Objective     Palpation     Additional Palpation Details  No sig tenderness to palpation    Active Range of Motion   Left Shoulder   Flexion: 145 degrees   Abduction: 55 degrees     Right Shoulder   Flexion: 175 degrees   Abduction: 175 degrees     Passive Range of Motion     Right Shoulder   Flexion: 168 degrees   Abduction: 165 degrees   External rotation 45°: 70 degrees   Internal rotation 45°: 65 degrees              Precautions: none      Manuals             PROM L shoulder BURKE                                                   Neuro Re-Ed                                                                                                        Ther Ex             pulleys nv            Table slides flex, scap, ER 10\" x10 ea            Wall slides nv            Strap IR stretch nv            Doorway pec " stretch nv            Supine wand scaption nv                                      Ther Activity                                       Gait Training                                       Modalities                                           Eval/Re-Eval POC Expires Auth #/ Referral # Total Visits Start Date Expiration Date Extension Info Visits Limitation   2/14 4/14 AETNA          BOMN                                                         1 2 3 4 5 6   2/14 F        7 8 9 10 11 12           13 14 15 16 17 18           19 20 21 22 23 24           25 26 27 28 29 30

## 2024-02-16 ENCOUNTER — OFFICE VISIT (OUTPATIENT)
Dept: PHYSICAL THERAPY | Facility: MEDICAL CENTER | Age: 19
End: 2024-02-16
Payer: COMMERCIAL

## 2024-02-16 DIAGNOSIS — M25.512 LEFT SHOULDER PAIN, UNSPECIFIED CHRONICITY: ICD-10-CM

## 2024-02-16 DIAGNOSIS — Z09 POSTOP CHECK: Primary | ICD-10-CM

## 2024-02-16 DIAGNOSIS — Z47.89 ORTHOPEDIC AFTERCARE: ICD-10-CM

## 2024-02-16 PROCEDURE — 97110 THERAPEUTIC EXERCISES: CPT | Performed by: PHYSICAL THERAPIST

## 2024-02-16 PROCEDURE — 97140 MANUAL THERAPY 1/> REGIONS: CPT | Performed by: PHYSICAL THERAPIST

## 2024-02-16 NOTE — PROGRESS NOTES
"Daily Note     Today's date: 2024  Patient name: Betsy Vivar  : 2005  MRN: [id number]  Referring provider: Jeffrey Lucia MD  Dx:   Encounter Diagnosis     ICD-10-CM    1. Postop check  Z09       2. Left shoulder pain, unspecified chronicity  M25.512       3. Orthopedic aftercare  Z47.89                      Subjective: Pt offers no complaints prior to treatment      Objective: See treatment diary below      Assessment: Tolerated treatment well. Patient demonstrated fatigue post treatment, exhibited good technique with therapeutic exercises, and would benefit from continued PT  Sig gains noted in ROM in all planes today.  PROM wnl  Initiate strengthening next visit.      Plan: Continue per plan of care.      Precautions: none      Manuals            PROM L shoulder BURKE 10 min                                                  Neuro Re-Ed             Supine active flex  nv           Sidelying active abd  nv           Sidelying ER  nv           TB rows  nv           TB ext  nv                                     Ther Ex            pulleys nv 5 min           Table slides flex, scap, ER 10\" x10 ea 10\" x10 ea           Wall slides nv 10\" x10           Strap IR stretch nv 10\" x10           Doorway pec stretch nv 10\" x10           Supine wand scaption nv 10\" x10                                     Ther Activity                                       Gait Training                                       Modalities                                         Eval/Re-Eval POC Expires Auth #/ Referral # Total Visits Start Date Expiration Date Extension Info Visits Limitation    AETNA          BOMN                                                                           1 2 3 4 5 6    F             7 8 9 10 11 12                 13 14 15 16 17 18                 19 20 21 22 23 24                 25 26 27 28 29 30                       "

## 2024-02-20 ENCOUNTER — OFFICE VISIT (OUTPATIENT)
Dept: PHYSICAL THERAPY | Facility: MEDICAL CENTER | Age: 19
End: 2024-02-20
Payer: COMMERCIAL

## 2024-02-20 DIAGNOSIS — Z47.89 ORTHOPEDIC AFTERCARE: ICD-10-CM

## 2024-02-20 DIAGNOSIS — M25.512 LEFT SHOULDER PAIN, UNSPECIFIED CHRONICITY: ICD-10-CM

## 2024-02-20 DIAGNOSIS — Z09 POSTOP CHECK: Primary | ICD-10-CM

## 2024-02-20 PROCEDURE — 97140 MANUAL THERAPY 1/> REGIONS: CPT | Performed by: PHYSICAL THERAPIST

## 2024-02-20 PROCEDURE — 97112 NEUROMUSCULAR REEDUCATION: CPT | Performed by: PHYSICAL THERAPIST

## 2024-02-20 NOTE — PROGRESS NOTES
"Daily Note     Today's date: 2024  Patient name: Betsy Vivar  : 2005  MRN: [id number]  Referring provider: Jeffrey Lucia MD  Dx:   Encounter Diagnosis     ICD-10-CM    1. Postop check  Z09       2. Left shoulder pain, unspecified chronicity  M25.512       3. Orthopedic aftercare  Z47.89                      Subjective: Pt offers no complaints of pain.      Objective: See treatment diary below      Assessment: Tolerated treatment well. Patient demonstrated fatigue post treatment, exhibited good technique with therapeutic exercises, and would benefit from continued PT  Tolerated initiation of strengthening very well.      Plan: Continue per plan of care.      Precautions: none      Manuals           PROM L shoulder BURKE 10 min 10 min                                                 Neuro Re-Ed            Supine active flex  nv x20          Sidelying active abd  nv x20          Sidelying ER  nv x20          TB rows  nv RTB 5\"x20          TB ext  nv RTB 5\"x20                                    Ther Ex           pulleys nv 5 min 5 min          Table slides flex, scap, ER 10\" x10 ea 10\" x10 ea           Wall slides nv 10\" x10 10\" x10          Strap IR stretch nv 10\" x10           Doorway pec stretch nv 10\" x10           Supine wand scaption nv 10\" x10                                     Ther Activity                                       Gait Training                                       Modalities                                         Eval/Re-Eval POC Expires Auth #/ Referral # Total Visits Start Date Expiration Date Extension Info Visits Limitation    AETNA          BOMN                                                                           1 2 3 4 5 6    F           7 8 9 10 11 12                 13 14 15 16 17 18                 19 20 21 22 23 24                 25 26 27 28 29 30                       "

## 2024-02-22 ENCOUNTER — OFFICE VISIT (OUTPATIENT)
Dept: PLASTIC SURGERY | Facility: CLINIC | Age: 19
End: 2024-02-22
Payer: COMMERCIAL

## 2024-02-22 DIAGNOSIS — L74.510 AXILLARY HYPERHIDROSIS: Primary | ICD-10-CM

## 2024-02-22 PROCEDURE — 99214 OFFICE O/P EST MOD 30 MIN: CPT | Performed by: STUDENT IN AN ORGANIZED HEALTH CARE EDUCATION/TRAINING PROGRAM

## 2024-02-22 PROCEDURE — 64612 DESTROY NERVE FACE MUSCLE: CPT | Performed by: STUDENT IN AN ORGANIZED HEALTH CARE EDUCATION/TRAINING PROGRAM

## 2024-02-22 PROCEDURE — BOTOX3 THREE OR MORE AREAS OR GREATER THAN 75 UNITS: Performed by: STUDENT IN AN ORGANIZED HEALTH CARE EDUCATION/TRAINING PROGRAM

## 2024-02-22 NOTE — PROGRESS NOTES
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     50 units per axilla     Total 100 units, approved by insurance     Patient tolerated well, f/u in 3 months after May 22nd  -Spent 35 minutes in consultation with patient. Greater than 50% of the total time was spent obtaining history, evaluation, performing exam, discussion of management options including post-operative care, answering patient's questions and concerns, chart reviewing, and documentation           Jori Blancas MD   [facility]   [address] [place] [address], [address]   [place] [address]   Office: [phone]

## 2024-02-23 ENCOUNTER — OFFICE VISIT (OUTPATIENT)
Dept: PHYSICAL THERAPY | Facility: MEDICAL CENTER | Age: 19
End: 2024-02-23
Payer: COMMERCIAL

## 2024-02-23 DIAGNOSIS — Z09 POSTOP CHECK: Primary | ICD-10-CM

## 2024-02-23 DIAGNOSIS — M25.512 LEFT SHOULDER PAIN, UNSPECIFIED CHRONICITY: ICD-10-CM

## 2024-02-23 DIAGNOSIS — Z47.89 ORTHOPEDIC AFTERCARE: ICD-10-CM

## 2024-02-23 PROCEDURE — 97110 THERAPEUTIC EXERCISES: CPT | Performed by: PHYSICAL THERAPIST

## 2024-02-23 PROCEDURE — 97112 NEUROMUSCULAR REEDUCATION: CPT | Performed by: PHYSICAL THERAPIST

## 2024-02-23 PROCEDURE — 97140 MANUAL THERAPY 1/> REGIONS: CPT | Performed by: PHYSICAL THERAPIST

## 2024-02-23 NOTE — PROGRESS NOTES
"Daily Note     Today's date: 2024  Patient name: Betsy Vivar  : 2005  MRN: [id number]  Referring provider: Jeffrey Lucia MD  Dx:   Encounter Diagnosis     ICD-10-CM    1. Postop check  Z09       2. Left shoulder pain, unspecified chronicity  M25.512       3. Orthopedic aftercare  Z47.89           Start Time: 08  Stop Time: 09  Total time in clinic (min): 35 minutes    Subjective: Pt reports she is feeling really good.       Objective: See treatment diary below      Assessment: Tolerated treatment well. Patient demonstrated fatigue post treatment, exhibited good technique with therapeutic exercises, and would benefit from continued PT  Progressing well w/ strengthening.      Plan: Continue per plan of care.      Precautions: none      Manuals          PROM L shoulder BURKE 10 min 10 min 10 min                                                Neuro Re-Ed           Supine active flex  nv x20 x20         Sidelying active abd  nv x20 x20         Sidelying ER  nv x20 x20         TB rows  nv RTB 5\"x20 GTB 5\"x20         TB ext  nv RTB 5\"x20 GTB 5\"X20         Prone rows and ext    X20 ea                      Ther Ex          pulleys nv 5 min 5 min 5 min         Table slides flex, scap, ER 10\" x10 ea 10\" x10 ea           Wall slides nv 10\" x10 10\" x10 10\" x10         Strap IR stretch nv 10\" x10           Doorway pec stretch nv 10\" x10           Supine wand scaption nv 10\" x10                                     Ther Activity                                       Gait Training                                       Modalities                                         Eval/Re-Eval POC Expires Auth #/ Referral # Total Visits Start Date Expiration Date Extension Info Visits Limitation    AETNA          BOMN                                                                           1 2 3 4 5 6    F         7 8 9 10 11 12               "   13 14 15 16 17 18                 19 20 21 22 23 24                 25 26 27 28 29 30

## 2024-02-27 ENCOUNTER — APPOINTMENT (OUTPATIENT)
Dept: PHYSICAL THERAPY | Facility: MEDICAL CENTER | Age: 19
End: 2024-02-27
Payer: COMMERCIAL

## 2024-02-29 ENCOUNTER — OFFICE VISIT (OUTPATIENT)
Dept: PHYSICAL THERAPY | Facility: MEDICAL CENTER | Age: 19
End: 2024-02-29
Payer: COMMERCIAL

## 2024-02-29 DIAGNOSIS — M25.512 LEFT SHOULDER PAIN, UNSPECIFIED CHRONICITY: Primary | ICD-10-CM

## 2024-02-29 DIAGNOSIS — Z47.89 ORTHOPEDIC AFTERCARE: ICD-10-CM

## 2024-02-29 PROCEDURE — 97112 NEUROMUSCULAR REEDUCATION: CPT | Performed by: PHYSICAL THERAPIST

## 2024-02-29 NOTE — PROGRESS NOTES
"Daily Note     Today's date: 2024  Patient name: Betsy Vivar  : 2005  MRN: [id number]  Referring provider: Jeffrey Lucia MD  Dx:   Encounter Diagnosis     ICD-10-CM    1. Left shoulder pain, unspecified chronicity  M25.512       2. Orthopedic aftercare  Z47.89           Start Time: 1555  Stop Time: 1625  Total time in clinic (min): 30 minutes    Subjective: Pt continues to deny pain;  progressing very well.      Objective: See treatment diary below      Assessment: Tolerated treatment well. Patient demonstrated fatigue post treatment, exhibited good technique with therapeutic exercises, and would benefit from continued PT  Progressing w/ strengthening very well      Plan: Continue per plan of care.      Precautions: none      Manuals         PROM L shoulder BURKE 10 min 10 min 10 min NP wnl                                               Neuro Re-Ed          Supine active flex  nv x20 x20 2# x20        Sidelying active abd  nv x20 x20 2# x20        Sidelying ER  nv x20 x20 2# x20        TB rows  nv RTB 5\"x20 GTB 5\"x20 BluTB 5\"x20        TB ext  nv RTB 5\"x20 GTB 5\"X20 BluTB 5\"x20        Prone rows and ext    X20 ea 2# x20 ea        Prone horizontal abd     2# x20        TB ER and IR     BluTB x20 ea                                               Ther Ex             pulleys     5 min                                               Ther Activity                                       Gait Training                                       Modalities                                         Eval/Re-Eval POC Expires Auth #/ Referral # Total Visits Start Date Expiration Date Extension Info Visits Limitation    AETNA          BOMN                                                                           1 2 3 4 5 6    F       7 8 9 10 11 12                 13 14 15 16 17 18                 19 20 21 22 23 24                 25 26 27 28 29 " 30

## 2024-03-05 ENCOUNTER — OFFICE VISIT (OUTPATIENT)
Dept: PHYSICAL THERAPY | Facility: MEDICAL CENTER | Age: 19
End: 2024-03-05
Payer: COMMERCIAL

## 2024-03-05 DIAGNOSIS — Z47.89 ORTHOPEDIC AFTERCARE: ICD-10-CM

## 2024-03-05 DIAGNOSIS — M25.512 LEFT SHOULDER PAIN, UNSPECIFIED CHRONICITY: Primary | ICD-10-CM

## 2024-03-05 PROCEDURE — 97112 NEUROMUSCULAR REEDUCATION: CPT | Performed by: PHYSICAL THERAPIST

## 2024-03-05 NOTE — PROGRESS NOTES
"Daily Note     Today's date: 3/5/2024  Patient name: Betsy Vivar  : 2005  MRN: [id number]  Referring provider: Jeffrey Lucia MD  Dx:   Encounter Diagnosis     ICD-10-CM    1. Left shoulder pain, unspecified chronicity  M25.512       2. Orthopedic aftercare  Z47.89                      Subjective: Pt reports \"feeling great\"      Objective: See treatment diary below      Assessment: Tolerated treatment well. Patient demonstrated fatigue post treatment, exhibited good technique with therapeutic exercises, and would benefit from continued PT  Progressing w/ strengthening very well.  No pain offered t/o treatment.      Plan: Continue per plan of care.      Precautions: none      Manuals         PROM L shoulder BURKE 10 min 10 min 10 min NP wnl                                               Neuro Re-Ed   3/5       Supine active flex  nv x20 x20 2# x20 3# x20       Sidelying active abd  nv x20 x20 2# x20 3# x20       Sidelying ER  nv x20 x20 2# x20 3# x20       TB rows  nv RTB 5\"x20 GTB 5\"x20 BluTB 5\"x20 BluTB 5\"x20       TB ext  nv RTB 5\"x20 GTB 5\"X20 BluTB 5\"x20 BluTB 5\"x20       Prone rows and ext    X20 ea 2# x20 ea 3# x20 ea       Prone horizontal abd     2# x20 3# x20       TB ER and IR     BluTB x20 ea BluTB x20 ea       UBE      3 min ea way                                 Ther Ex     2/29 3/5       pulleys     5 min                                               Ther Activity                                       Gait Training                                       Modalities                                         Eval/Re-Eval POC Expires Auth #/ Referral # Total Visits Start Date Expiration Date Extension Info Visits Limitation    AETNA          BOMN                                                                           1 2 3 4 5 6    F   3/5    7 8 9 10 11 12                 13 14 15 16 17 18                 19 20 21 22 23 " 24                 25 26 27 28 29 30

## 2024-03-07 ENCOUNTER — OFFICE VISIT (OUTPATIENT)
Dept: PHYSICAL THERAPY | Facility: MEDICAL CENTER | Age: 19
End: 2024-03-07
Payer: COMMERCIAL

## 2024-03-07 ENCOUNTER — APPOINTMENT (OUTPATIENT)
Dept: LAB | Facility: MEDICAL CENTER | Age: 19
End: 2024-03-07
Payer: COMMERCIAL

## 2024-03-07 DIAGNOSIS — Z13.1 SCREENING FOR DIABETES MELLITUS: ICD-10-CM

## 2024-03-07 DIAGNOSIS — Z47.89 ORTHOPEDIC AFTERCARE: ICD-10-CM

## 2024-03-07 DIAGNOSIS — M25.512 LEFT SHOULDER PAIN, UNSPECIFIED CHRONICITY: Primary | ICD-10-CM

## 2024-03-07 DIAGNOSIS — Z13.0 SCREENING, ANEMIA, DEFICIENCY, IRON: ICD-10-CM

## 2024-03-07 LAB
ALBUMIN SERPL BCP-MCNC: 4.6 G/DL (ref 3.5–5)
ALP SERPL-CCNC: 54 U/L (ref 34–104)
ALT SERPL W P-5'-P-CCNC: 18 U/L (ref 7–52)
ANION GAP SERPL CALCULATED.3IONS-SCNC: 10 MMOL/L
AST SERPL W P-5'-P-CCNC: 21 U/L (ref 13–39)
BASOPHILS # BLD AUTO: 0.05 THOUSANDS/ÂΜL (ref 0–0.1)
BASOPHILS NFR BLD AUTO: 1 % (ref 0–1)
BILIRUB SERPL-MCNC: 0.35 MG/DL (ref 0.2–1)
BUN SERPL-MCNC: 12 MG/DL (ref 5–25)
CALCIUM SERPL-MCNC: 9.7 MG/DL (ref 8.4–10.2)
CHLORIDE SERPL-SCNC: 103 MMOL/L (ref 96–108)
CHOLEST SERPL-MCNC: 185 MG/DL
CO2 SERPL-SCNC: 25 MMOL/L (ref 21–32)
CREAT SERPL-MCNC: 0.75 MG/DL (ref 0.6–1.3)
EOSINOPHIL # BLD AUTO: 0.15 THOUSAND/ÂΜL (ref 0–0.61)
EOSINOPHIL NFR BLD AUTO: 2 % (ref 0–6)
ERYTHROCYTE [DISTWIDTH] IN BLOOD BY AUTOMATED COUNT: 13.3 % (ref 11.6–15.1)
GFR SERPL CREATININE-BSD FRML MDRD: 115 ML/MIN/1.73SQ M
GLUCOSE P FAST SERPL-MCNC: 79 MG/DL (ref 65–99)
HCT VFR BLD AUTO: 43.7 % (ref 34.8–46.1)
HDLC SERPL-MCNC: 69 MG/DL
HGB BLD-MCNC: 13.9 G/DL (ref 11.5–15.4)
IMM GRANULOCYTES # BLD AUTO: 0.02 THOUSAND/UL (ref 0–0.2)
IMM GRANULOCYTES NFR BLD AUTO: 0 % (ref 0–2)
LDLC SERPL CALC-MCNC: 94 MG/DL (ref 0–100)
LYMPHOCYTES # BLD AUTO: 2.09 THOUSANDS/ÂΜL (ref 0.6–4.47)
LYMPHOCYTES NFR BLD AUTO: 33 % (ref 14–44)
MCH RBC QN AUTO: 29.4 PG (ref 26.8–34.3)
MCHC RBC AUTO-ENTMCNC: 31.8 G/DL (ref 31.4–37.4)
MCV RBC AUTO: 93 FL (ref 82–98)
MONOCYTES # BLD AUTO: 0.49 THOUSAND/ÂΜL (ref 0.17–1.22)
MONOCYTES NFR BLD AUTO: 8 % (ref 4–12)
NEUTROPHILS # BLD AUTO: 3.48 THOUSANDS/ÂΜL (ref 1.85–7.62)
NEUTS SEG NFR BLD AUTO: 56 % (ref 43–75)
NRBC BLD AUTO-RTO: 0 /100 WBCS
PLATELET # BLD AUTO: 443 THOUSANDS/UL (ref 149–390)
PMV BLD AUTO: 8.5 FL (ref 8.9–12.7)
POTASSIUM SERPL-SCNC: 4.6 MMOL/L (ref 3.5–5.3)
PROT SERPL-MCNC: 7.5 G/DL (ref 6.4–8.4)
RBC # BLD AUTO: 4.72 MILLION/UL (ref 3.81–5.12)
SODIUM SERPL-SCNC: 138 MMOL/L (ref 135–147)
TRIGL SERPL-MCNC: 112 MG/DL
WBC # BLD AUTO: 6.28 THOUSAND/UL (ref 4.31–10.16)

## 2024-03-07 PROCEDURE — 85025 COMPLETE CBC W/AUTO DIFF WBC: CPT

## 2024-03-07 PROCEDURE — 97112 NEUROMUSCULAR REEDUCATION: CPT | Performed by: PHYSICAL THERAPIST

## 2024-03-07 PROCEDURE — 80061 LIPID PANEL: CPT

## 2024-03-07 PROCEDURE — 36415 COLL VENOUS BLD VENIPUNCTURE: CPT

## 2024-03-07 PROCEDURE — 80053 COMPREHEN METABOLIC PANEL: CPT

## 2024-03-07 NOTE — PROGRESS NOTES
"Daily Note     Today's date: 3/7/2024  Patient name: Betsy Vivar  : 2005  MRN: [id number]  Referring provider: Jeffrey Lucia MD  Dx:   Encounter Diagnosis     ICD-10-CM    1. Left shoulder pain, unspecified chronicity  M25.512       2. Orthopedic aftercare  Z47.89                      Subjective: Pt w/ no reports of pain      Objective: See treatment diary below      Assessment: Tolerated treatment well. Patient demonstrated fatigue post treatment, exhibited good technique with therapeutic exercises, and would benefit from continued PT  Continues to progress very well w/ strengthening.  Fatigue w/ additions today; especially w/ seated scaption and KB OH      Plan: Continue per plan of care.      Precautions: none      Manuals         PROM L shoulder BURKE 10 min 10 min 10 min NP wnl                                               Neuro Re-Ed   3/ 3      Supine active flex  nv x20 x20 2# x20 3# x20 4# x20      Sidelying active abd  nv x20 x20 2# x20 3# x20 4# x20      Sidelying ER  nv x20 x20 2# x20 3# x20 4# x20      TB rows  nv RTB 5\"x20 GTB 5\"x20 BluTB 5\"x20 BluTB 5\"x20 BluTB 5\"x20      TB ext  nv RTB 5\"x20 GTB 5\"X20 BluTB 5\"x20 BluTB 5\"x20 BluTB 5\"20      Prone rows and ext    X20 ea 2# x20 ea 3# x20 ea 4# x20 ea      Prone horizontal abd     2# x20 3# x20 4# x20      TB ER and IR     BluTB x20 ea BluTB x20 ea BluTB x20      UBE      3 min ea way 3 min ea way      Shoulder flex and scap       4# x20 ea      KB OH press       4# x10      TB PNF D1/2       RTB x20 ea                                                          Ther Activity                                       Gait Training                                       Modalities                                         Eval/Re-Eval POC Expires Auth #/ Referral # Total Visits Start Date Expiration Date Extension Info Visits Limitation    AETNA          BOMN                                          "                                  1 2 3 4 5 6   2/14 F  2/16  2/20 2/23 2/29 3/5    7 8 9 10 11 12    3/7             13 14 15 16 17 18                 19 20 21 22 23 24                 25 26 27 28 29 30

## 2024-03-12 ENCOUNTER — APPOINTMENT (OUTPATIENT)
Dept: PHYSICAL THERAPY | Facility: MEDICAL CENTER | Age: 19
End: 2024-03-12
Payer: COMMERCIAL

## 2024-03-12 ENCOUNTER — OFFICE VISIT (OUTPATIENT)
Dept: FAMILY MEDICINE CLINIC | Facility: CLINIC | Age: 19
End: 2024-03-12
Payer: COMMERCIAL

## 2024-03-12 VITALS
TEMPERATURE: 96.5 F | SYSTOLIC BLOOD PRESSURE: 122 MMHG | WEIGHT: 151 LBS | HEART RATE: 114 BPM | DIASTOLIC BLOOD PRESSURE: 62 MMHG | HEIGHT: 66 IN | OXYGEN SATURATION: 98 % | RESPIRATION RATE: 16 BRPM | BODY MASS INDEX: 24.27 KG/M2

## 2024-03-12 DIAGNOSIS — J06.9 VIRAL UPPER RESPIRATORY TRACT INFECTION: Primary | ICD-10-CM

## 2024-03-12 PROCEDURE — 99213 OFFICE O/P EST LOW 20 MIN: CPT | Performed by: NURSE PRACTITIONER

## 2024-03-12 PROCEDURE — 87636 SARSCOV2 & INF A&B AMP PRB: CPT | Performed by: NURSE PRACTITIONER

## 2024-03-12 NOTE — ASSESSMENT & PLAN NOTE
Symptoms and exam most suggestive of viral uri.  Pt aware that this will likely resolve on its own and recommend supportive care with otc medications, tea with honey, flonase.  Though she knows she is not a candidate for antiviral, she would like to be tested for covid/flu.  Pcr sent to lab.  Pt instructed to call for reevaluation if sx worsen or persist.

## 2024-03-12 NOTE — PROGRESS NOTES
Name: Betsy Vivar      : 2005      MRN: [id number]  Encounter Provider: WENDI Booker  Encounter Date: 3/12/2024   [facility]    Assessment & Plan     1. Viral upper respiratory tract infection  Assessment & Plan:  Symptoms and exam most suggestive of viral uri.  Pt aware that this will likely resolve on its own and recommend supportive care with otc medications, tea with honey, flonase.  Though she knows she is not a candidate for antiviral, she would like to be tested for covid/flu.  Pcr sent to lab.  Pt instructed to call for reevaluation if sx worsen or persist.      Orders:  -     Covid19 and INFLUENZA A/B PCR           Subjective      Pt is a 18 y.o. y/o female who is seen today for evaluation of cold symptoms.  She started to feel sick on 3/9 with symptoms including congestion, sore throat, rhinorrhea, post-nasal drip, ear pain.  Denies fever, chills, body aches, fatigue.  Appetite is normal, no n/v/d.  Not taking anything for her symptoms.        Review of Systems   Constitutional:  Negative for appetite change, chills, fatigue and fever.   HENT:  Positive for congestion, ear pain, postnasal drip, rhinorrhea, sneezing and sore throat. Negative for sinus pressure.    Respiratory:  Positive for cough (infrequent). Negative for chest tightness and shortness of breath.    Cardiovascular:  Negative for chest pain and palpitations.   Gastrointestinal:  Negative for diarrhea, nausea and vomiting.   Musculoskeletal:  Negative for myalgias.   Neurological:  Negative for dizziness, light-headedness and headaches.   Hematological:  Positive for adenopathy.   Psychiatric/Behavioral:  Negative for sleep disturbance.        Current Outpatient Medications on File Prior to Visit   Medication Sig    Multiple Vitamin (multivitamin) capsule Take 1 capsule by mouth daily       Objective     /62 (BP Location: Left arm, Patient Position: Sitting, Cuff Size:  "Standard)   Pulse (!) 114   Temp (!) 96.5 °F (35.8 °C) (Tympanic)   Resp 16   Ht 5' 6\" (1.676 m)   Wt 68.5 kg (151 lb)   SpO2 98%   BMI 24.37 kg/m²     Physical Exam  Vitals reviewed.   Constitutional:       General: She is awake. She is not in acute distress.     Appearance: Normal appearance. She is well-developed and well-groomed. She is not ill-appearing.   HENT:      Head: Normocephalic.      Right Ear: Hearing, ear canal and external ear normal. No middle ear effusion. Tympanic membrane is bulging. Tympanic membrane is not erythematous.      Left Ear: Hearing, tympanic membrane, ear canal and external ear normal.  No middle ear effusion. Tympanic membrane is not bulging.      Nose: Mucosal edema and congestion present. No rhinorrhea.      Mouth/Throat:      Lips: Pink.      Mouth: Mucous membranes are not dry.      Pharynx: Oropharynx is clear. No oropharyngeal exudate (post nasal drip) or posterior oropharyngeal erythema.      Tonsils: No tonsillar abscesses.   Eyes:      General: Lids are normal.      Conjunctiva/sclera: Conjunctivae normal.   Cardiovascular:      Rate and Rhythm: Regular rhythm. Tachycardia present.      Heart sounds: Normal heart sounds. No murmur heard.  Pulmonary:      Effort: Pulmonary effort is normal. No accessory muscle usage or respiratory distress.      Breath sounds: Normal breath sounds. No decreased breath sounds, wheezing, rhonchi or rales.   Lymphadenopathy:      Head:      Right side of head: No submental, submandibular, tonsillar, preauricular, posterior auricular or occipital adenopathy.      Left side of head: No submental, submandibular, tonsillar, preauricular, posterior auricular or occipital adenopathy.      Cervical: No cervical adenopathy.   Skin:     General: Skin is warm and dry.   Neurological:      Mental Status: She is alert and oriented to person, place, and time.   Psychiatric:         Attention and Perception: Attention normal.         Mood and Affect: " Mood normal.         Speech: Speech normal.         Behavior: Behavior normal. Behavior is cooperative.         Thought Content: Thought content normal.         Cognition and Memory: Cognition normal.         Judgment: Judgment normal.       WENDI Booker

## 2024-03-14 ENCOUNTER — APPOINTMENT (OUTPATIENT)
Dept: PHYSICAL THERAPY | Facility: MEDICAL CENTER | Age: 19
End: 2024-03-14
Payer: COMMERCIAL

## 2024-04-15 ENCOUNTER — OFFICE VISIT (OUTPATIENT)
Dept: OBGYN CLINIC | Facility: MEDICAL CENTER | Age: 19
End: 2024-04-15

## 2024-04-15 VITALS
HEIGHT: 66 IN | SYSTOLIC BLOOD PRESSURE: 125 MMHG | HEART RATE: 92 BPM | WEIGHT: 151 LBS | DIASTOLIC BLOOD PRESSURE: 82 MMHG | BODY MASS INDEX: 24.27 KG/M2

## 2024-04-15 DIAGNOSIS — Z09 POSTOP CHECK: Primary | ICD-10-CM

## 2024-04-15 PROCEDURE — 99024 POSTOP FOLLOW-UP VISIT: CPT | Performed by: ORTHOPAEDIC SURGERY

## 2024-04-15 NOTE — PROGRESS NOTES
[facility] [place]  [address]  [facility] [address]       Betsy Dcner  [id number]  2005    ORTHOPAEDIC SURGERY OUTPATIENT NOTE  4/15/2024      HISTORY:  18 y.o. female presents for postop visit 10 weeks status post left shoulder diagnostic arthroscopy.  She reports doing very well.  She has no pain.  She is returned to the gym and activities.  SANE score 100%.    Past Medical History:   Diagnosis Date    Headache     Ovarian cyst        Past Surgical History:   Procedure Laterality Date    FL INJECTION LEFT SHOULDER (ARTHROGRAM)  12/27/2023    AL SURGICAL ARTHROSCOPY SHOULDER REPAIR SLAP LESION Left 2/2/2024    Procedure: ARTHROSCOPY SHOULDER- left Diagnostic;  Surgeon: Sylvia Clayton DO;  Location:  MAIN OR;  Service: Orthopedics    WISDOM TOOTH EXTRACTION Bilateral     bottom wisdom teeth removed       Social History     Socioeconomic History    Marital status: Single     Spouse name: Not on file    Number of children: Not on file    Years of education: Not on file    Highest education level: Not on file   Occupational History    Not on file   Tobacco Use    Smoking status: Never     Passive exposure: Never    Smokeless tobacco: Never   Vaping Use    Vaping status: Never Used   Substance and Sexual Activity    Alcohol use: Not Currently    Drug use: Never    Sexual activity: Never     Partners: Male   Other Topics Concern    Not on file   Social History Narrative    Not on file     Social Determinants of Health     Financial Resource Strain: Not on file   Food Insecurity: Not on file   Transportation Needs: Not on file   Physical Activity: Not on file   Stress: Not on file   Social Connections: Not on file   Intimate Partner Violence: Not on file   Housing Stability: Not on file       Family History   Problem Relation Age of Onset    Anxiety disorder Mother     Endometriosis Mother     Migraines Mother     Seizures Mother     Chiari  "malformation Mother     No Known Problems Father     Anxiety disorder Sister     Irritable bowel syndrome Sister     Diabetes Maternal Grandmother     Hypertension Maternal Grandfather     Diabetes Paternal Grandmother     Mental illness Neg Hx     Addiction problem Neg Hx         Patient's Medications   New Prescriptions    No medications on file   Previous Medications    MULTIPLE VITAMIN (MULTIVITAMIN) CAPSULE    Take 1 capsule by mouth daily   Modified Medications    No medications on file   Discontinued Medications    No medications on file       Allergies   Allergen Reactions    Procaine Rash        /82 (BP Location: Left arm, Patient Position: Sitting, Cuff Size: Standard)   Pulse 92   Ht 5' 6\" (1.676 m)   Wt 68.5 kg (151 lb)   BMI 24.37 kg/m²      REVIEW OF SYSTEMS:  Constitutional: Negative.    HEENT: Negative.    Respiratory: Negative.    Skin: Negative.    Neurological: Negative.    Psychiatric/Behavioral: Negative.  Musculoskeletal: Negative except for that mentioned in the HPI.    /82 (BP Location: Left arm, Patient Position: Sitting, Cuff Size: Standard)   Pulse 92   Ht 5' 6\" (1.676 m)   Wt 68.5 kg (151 lb)   BMI 24.37 kg/m²   Gen: No acute distress, resting comfortably in bed  HEENT: Eyes clear, moist mucus membranes, hearing intact  Respiratory: No audible wheezing or stridor  Cardiovascular: Well Perfused peripherally, 2+ distal pulse  Abdomen: nondistended, no peritoneal signs     PHYSICAL EXAM:    LEFT SHOULDER:    Appearance: well healed incisions    Forward flexion:   180 degrees   Abduction:  90 degrees   External rotation at 90 degrees abduction:   90 degrees   Internal rotation at 90 degrees abduction:  90 degrees   External rotation at 0 degrees:   80 degrees   Internal rotation: T7     STRENGTH:  Forward flexion:  5/5   Abduction:  5/5   External rotation:  5/5   Internal rotation:  5/5        Speed test: Negative  Tender to palpation ACJ (acromioclavicular joint): " Negative   Tender to palpation LHB (long head of biceps): Negative  Midway test: Negative  Cross-body adduction: Negative  Sulcus sign: Negative  Jose's test: Negative      Radial/median/ulnar nerve intact    <2 sec cap refill        IMAGING: No imaging    ASSESSMENT AND PLAN:  18 y.o. female status post left shoulder arthroscopy.  She is doing very well.  She has returned to all activities without restrictions.  At this point she may follow-up as needed.  Will see her back with any issues.

## 2024-05-01 PROBLEM — J06.9 VIRAL UPPER RESPIRATORY TRACT INFECTION: Status: RESOLVED | Noted: 2024-03-12 | Resolved: 2024-05-01

## 2024-05-31 ENCOUNTER — OFFICE VISIT (OUTPATIENT)
Dept: PLASTIC SURGERY | Facility: CLINIC | Age: 19
End: 2024-05-31
Payer: COMMERCIAL

## 2024-05-31 DIAGNOSIS — L74.510 AXILLARY HYPERHIDROSIS: Primary | ICD-10-CM

## 2024-05-31 PROCEDURE — 64612 DESTROY NERVE FACE MUSCLE: CPT | Performed by: STUDENT IN AN ORGANIZED HEALTH CARE EDUCATION/TRAINING PROGRAM

## 2024-05-31 PROCEDURE — 99214 OFFICE O/P EST MOD 30 MIN: CPT | Performed by: STUDENT IN AN ORGANIZED HEALTH CARE EDUCATION/TRAINING PROGRAM

## 2024-05-31 NOTE — PROGRESS NOTES
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     50 units per axilla     Total 100 units, approved by insurance     Patient tolerated well, f/u in 3 months after Sept 1  -Spent 35 minutes in consultation with patient. Greater than 50% of the total time was spent obtaining history, evaluation, performing exam, discussion of management options including post-operative care, answering patient's questions and concerns, chart reviewing, treatment, and documentation.           Jori Blancas MD   [facility]   [address] [place] [address], [address]   [place] [address]   Office: [phone]  
Residential stability/Relationship stability/Engagement in treatment

## 2024-09-04 ENCOUNTER — OFFICE VISIT (OUTPATIENT)
Dept: PLASTIC SURGERY | Facility: CLINIC | Age: 19
End: 2024-09-04
Payer: COMMERCIAL

## 2024-09-04 DIAGNOSIS — L74.510 AXILLARY HYPERHIDROSIS: Primary | ICD-10-CM

## 2024-09-04 PROCEDURE — 99214 OFFICE O/P EST MOD 30 MIN: CPT | Performed by: STUDENT IN AN ORGANIZED HEALTH CARE EDUCATION/TRAINING PROGRAM

## 2024-09-04 PROCEDURE — 64612 DESTROY NERVE FACE MUSCLE: CPT | Performed by: STUDENT IN AN ORGANIZED HEALTH CARE EDUCATION/TRAINING PROGRAM

## 2024-09-04 NOTE — PROGRESS NOTES
Botox Consult     First time?: no, received in May for axillary hyperhidrosis, approved by insurance  Allergies: none  Blood thinners: no   Pregnant: no  Neuromuscular conditions: no     Patient has had botox in May with good results, approved for axillary hyperhidrosis, 50 units per side (total 100 units every 3 months)     Will proceed with 100 units of botox     Risks and benefits discussed, patient agreed to proceed.     50 units per axilla     Total 100 units, approved by insurance     Patient tolerated well, f/u in 3 months after Dec 4  -Spent 35 minutes in consultation with patient. Greater than 50% of the total time was spent obtaining history, evaluation, performing exam, discussion of management options including post-operative care, answering patient's questions and concerns, chart reviewing, treatment, and documentation.           Jori Blancas MD   [facility]   [address] [place] [address], [address]   [place] [address]   Office: [phone]

## 2024-10-30 ENCOUNTER — APPOINTMENT (OUTPATIENT)
Dept: RADIOLOGY | Facility: MEDICAL CENTER | Age: 19
End: 2024-10-30
Payer: COMMERCIAL

## 2024-10-30 ENCOUNTER — APPOINTMENT (OUTPATIENT)
Dept: LAB | Facility: MEDICAL CENTER | Age: 19
End: 2024-10-30
Payer: COMMERCIAL

## 2024-10-30 ENCOUNTER — OFFICE VISIT (OUTPATIENT)
Dept: FAMILY MEDICINE CLINIC | Facility: CLINIC | Age: 19
End: 2024-10-30
Payer: COMMERCIAL

## 2024-10-30 VITALS
HEIGHT: 66 IN | RESPIRATION RATE: 17 BRPM | TEMPERATURE: 96.9 F | DIASTOLIC BLOOD PRESSURE: 80 MMHG | WEIGHT: 147 LBS | SYSTOLIC BLOOD PRESSURE: 120 MMHG | HEART RATE: 87 BPM | BODY MASS INDEX: 23.63 KG/M2 | OXYGEN SATURATION: 99 %

## 2024-10-30 DIAGNOSIS — M25.561 CHRONIC PAIN OF RIGHT KNEE: ICD-10-CM

## 2024-10-30 DIAGNOSIS — M54.50 CHRONIC MIDLINE LOW BACK PAIN WITHOUT SCIATICA: Primary | ICD-10-CM

## 2024-10-30 DIAGNOSIS — G89.29 CHRONIC PAIN OF RIGHT KNEE: ICD-10-CM

## 2024-10-30 DIAGNOSIS — M54.50 CHRONIC MIDLINE LOW BACK PAIN WITHOUT SCIATICA: ICD-10-CM

## 2024-10-30 DIAGNOSIS — G89.29 CHRONIC MIDLINE LOW BACK PAIN WITHOUT SCIATICA: ICD-10-CM

## 2024-10-30 DIAGNOSIS — G89.29 CHRONIC MIDLINE LOW BACK PAIN WITHOUT SCIATICA: Primary | ICD-10-CM

## 2024-10-30 LAB
CRP SERPL QL: <1 MG/L
TSH SERPL DL<=0.05 MIU/L-ACNC: 1.05 UIU/ML (ref 0.45–4.5)

## 2024-10-30 PROCEDURE — 73562 X-RAY EXAM OF KNEE 3: CPT

## 2024-10-30 PROCEDURE — 86140 C-REACTIVE PROTEIN: CPT

## 2024-10-30 PROCEDURE — 72110 X-RAY EXAM L-2 SPINE 4/>VWS: CPT

## 2024-10-30 PROCEDURE — 99213 OFFICE O/P EST LOW 20 MIN: CPT | Performed by: NURSE PRACTITIONER

## 2024-10-30 PROCEDURE — 86038 ANTINUCLEAR ANTIBODIES: CPT

## 2024-10-30 PROCEDURE — 36415 COLL VENOUS BLD VENIPUNCTURE: CPT

## 2024-10-30 RX ORDER — SODIUM FLUORIDE 6 MG/ML
PASTE, DENTIFRICE DENTAL
COMMUNITY
Start: 2024-09-26

## 2024-10-30 NOTE — PROGRESS NOTES
FAMILY PRACTICE OFFICE VISIT       NAME: Betsy Vivar  AGE: 19 y.o. SEX: female       : 2005        MRN: [id number]    DATE: 10/31/2024  TIME: 8:17 AM    Assessment and Plan   1. Chronic midline low back pain without sciatica  -     HLA-B27 antigen; Future  -     C-reactive protein; Future  -     BEVERLEY Screen w/ Reflex to Titer/Pattern; Future  -     XR spine lumbar minimum 4 views non injury; Future; Expected date: 10/30/2024  -     Ambulatory Referral to Orthopedic Surgery; Future  2. Chronic pain of right knee  -     XR knee 3 vw right non injury; Future; Expected date: 10/30/2024       There are no Patient Instructions on file for this visit.        Chief Complaint     Chief Complaint   Patient presents with    Back Pain     Pt being seen for back pain and on and off knee pain       History of Present Illness   Betsy Vivar is a 19 y.o.-year-old female who is here for c/o back and knee pain  Back pain around tail bone in may  No fall or injury  Xray done and was better  Comes and goes  Hx of spondylosis runs in family  Right knee pain on and off  Medial aspect of right knee on and off since July  No known injury  Does weight lift and go to gym  Does not run  Tried ibuprofen on and off    [facility] sacrum and coccyx     Indication: Tailbone pain since yesterday. No trauma.     Sacrum and coccyx do not reveal a fracture or other bony abnormality.     Answers submitted by the patient for this visit:  Back Pain Questionnaire (Submitted on 10/29/2024)  Chief Complaint: Back pain  Chronicity: new  Onset: 1 to 4 weeks ago  Frequency: 2 to 4 times per day  Progression since onset: waxing and waning  Pain location: gluteal, sacro-iliac  Pain quality: shooting  Radiates to: does not radiate  Pain - numeric: 4/10  Pain is: the same all the time  Aggravated by: sitting  Stiffness is present: all day  bladder incontinence: No  bowel incontinence: No  leg pain: No  perianal numbness:  No  paresis: No  paresthesias: No  tingling: No  weight loss: No        Review of Systems   Review of Systems   Constitutional:  Negative for fever.   Cardiovascular:  Negative for chest pain.   Gastrointestinal:  Negative for abdominal pain.   Genitourinary:  Negative for dysuria and pelvic pain.   Musculoskeletal:  Positive for back pain.   Neurological:  Negative for weakness, numbness and headaches.       Active Problem List     Patient Active Problem List   Diagnosis    Ovarian cyst    Hyperhidrosis of palms    Axillary hyperhidrosis    Internal derangement of left shoulder    Tear of left glenoid labrum    Chronic midline low back pain without sciatica    Chronic pain of right knee         Past Medical History:  Past Medical History:   Diagnosis Date    Headache     Ovarian cyst        Past Surgical History:  Past Surgical History:   Procedure Laterality Date    FL INJECTION LEFT SHOULDER (ARTHROGRAM)  12/27/2023    [place] SURGICAL ARTHROSCOPY SHOULDER REPAIR SLAP LESION Left 2/2/2024    Procedure: ARTHROSCOPY SHOULDER- left Diagnostic;  Surgeon: Sylvia Clayton DO;  Location:  MAIN OR;  Service: Orthopedics    WISDOM TOOTH EXTRACTION Bilateral     bottom wisdom teeth removed       Family History:  Family History   Problem Relation Age of Onset    Anxiety disorder Mother     Endometriosis Mother     Migraines Mother     Seizures Mother     Chiari malformation Mother     No Known Problems Father     Anxiety disorder Sister     Irritable bowel syndrome Sister     Diabetes Maternal Grandmother     Hypertension Maternal Grandfather     Diabetes Paternal Grandmother     Mental illness Neg Hx     Addiction problem Neg Hx        Social History:  Social History     Socioeconomic History    Marital status: Single     Spouse name: Not on file    Number of children: Not on file    Years of education: Not on file    Highest education level: Not on file   Occupational History    Not on file   Tobacco Use    Smoking status:  Never     Passive exposure: Never    Smokeless tobacco: Never   Vaping Use    Vaping status: Never Used   Substance and Sexual Activity    Alcohol use: Not Currently    Drug use: Never    Sexual activity: Never     Partners: Male   Other Topics Concern    Not on file   Social History Narrative    Not on file     Social Determinants of Health     Financial Resource Strain: Not on file   Food Insecurity: Not on file   Transportation Needs: Not on file   Physical Activity: Not on file   Stress: Not on file   Social Connections: Not on file   Intimate Partner Violence: Not on file   Housing Stability: Not on file       Objective     Vitals:    10/30/24 1453   BP: 120/80   Pulse: 87   Resp: 17   Temp: (!) 96.9 °F (36.1 °C)   SpO2: 99%     Wt Readings from Last 3 Encounters:   10/30/24 66.7 kg (147 lb)   04/15/24 68.5 kg (151 lb) (81%, Z= 0.87)*   03/12/24 68.5 kg (151 lb) (81%, Z= 0.88)*     * Growth percentiles are based on [facility] (Girls, 2-20 Years) data.       Physical Exam  Vitals and nursing note reviewed.   Constitutional:       Appearance: Normal appearance.   HENT:      Head: Normocephalic and atraumatic.   Eyes:      Conjunctiva/sclera: Conjunctivae normal.   Cardiovascular:      Rate and Rhythm: Normal rate and regular rhythm.      Heart sounds: Normal heart sounds.   Pulmonary:      Effort: Pulmonary effort is normal.      Breath sounds: Normal breath sounds.   Musculoskeletal:      Cervical back: Normal range of motion and neck supple.      Lumbar back: Normal.      Right knee: Normal. No effusion, erythema, bony tenderness or crepitus. Normal range of motion. No tenderness. No MCL laxity. Normal patellar mobility.      Instability Tests: Anterior drawer test negative.      Left knee: Normal.   Skin:     General: Skin is warm and dry.      Capillary Refill: Capillary refill takes less than 2 seconds.   Neurological:      Mental Status: She is alert and oriented to person, place, and time.   Psychiatric:          "Mood and Affect: Mood normal.         Behavior: Behavior normal.         Pertinent Laboratory/Diagnostic Studies:  Lab Results   Component Value Date    BUN 12 03/07/2024    CREATININE 0.75 03/07/2024    CALCIUM 9.7 03/07/2024    K 4.6 03/07/2024    CO2 25 03/07/2024     03/07/2024     Lab Results   Component Value Date    ALT 18 03/07/2024    AST 21 03/07/2024    ALKPHOS 54 03/07/2024       Lab Results   Component Value Date    WBC 6.28 03/07/2024    HGB 13.9 03/07/2024    HCT 43.7 03/07/2024    MCV 93 03/07/2024     (H) 03/07/2024       No results found for: \"TSH\"    No results found for: \"CHOL\"  Lab Results   Component Value Date    TRIG 112 03/07/2024     Lab Results   Component Value Date    HDL 69 03/07/2024     Lab Results   Component Value Date    LDLCALC 94 03/07/2024     No results found for: \"HGBA1C\"    Results for orders placed or performed in visit on 03/12/24   Covid19 and INFLUENZA A/B PCR    Collection Time: 03/12/24 10:29 AM    Specimen: Nares   Result Value Ref Range    SARS-CoV-2 Negative Negative    INFLUENZA A PCR Negative Negative    INFLUENZA B PCR Negative Negative       Orders Placed This Encounter   Procedures    XR spine lumbar minimum 4 views non injury    XR knee 3 vw right non injury    HLA-B27 antigen    C-reactive protein    BEVERLEY Screen w/ Reflex to Titer/Pattern    Ambulatory Referral to Orthopedic Surgery       ALLERGIES:  Allergies   Allergen Reactions    Procaine Rash       Current Medications     Current Outpatient Medications   Medication Sig Dispense Refill    Sodium Fluoride 5000 PPM 1.1 % PSTE USE LIKE TOOTHPASTE. DO NOT RINSE, DRINK OR EAT FOR 30 MINUTES AFTER USE.      Multiple Vitamin (multivitamin) capsule Take 1 capsule by mouth daily       No current facility-administered medications for this visit.         Health Maintenance     Health Maintenance   Topic Date Due    HIV Screening  11/14/2024 (Originally 6/18/2019)    Hepatitis C Screening  12/14/2024 " (Originally 2005)    COVID-19 Vaccine (4 - 2023-24 season) 01/31/2025 (Originally 9/1/2024)    HPV Vaccine (1 - 3-dose series) 02/06/2025 (Originally 6/18/2019)    Influenza Vaccine (1) 06/30/2025 (Originally 9/1/2024)    Annual Physical  02/06/2025    Depression Screening  10/30/2025    DTaP,Tdap,and Td Vaccines (7 - Td or Tdap) 05/01/2027    Zoster Vaccine (1 of 2) 06/18/2054    RSV Vaccine Age 60+ Years (1 - 1-dose 60+ series) 06/18/2064    HIB Vaccine  Completed    IPV Vaccine  Completed    Meningococcal ACWY Vaccine  Completed    RSV Vaccine age 0-20 Months  Aged Out    Pneumococcal Vaccine: Pediatrics (0 to 5 Years) and At-Risk Patients (6 to 64 Years)  Aged Out    Hepatitis A Vaccine  Aged Out     Immunization History   Administered Date(s) Administered    COVID-19 PFIZER VACCINE 0.3 ML IM 07/20/2021, 08/10/2021    COVID-19 Pfizer vac (Juan-sucrose, gray cap) 12 yr+ IM 02/22/2022    DTaP 5 2004, 2004, 01/11/2005, 09/22/2005, 04/29/2010    Hep B, adult 2005, 2004, 04/01/2015    Hib (PRP-OMP) 2004, 2004, 01/11/2005, 06/30/2005    IPV 2005, 2004, 09/22/2005, 04/29/2010    MMR 06/30/2005, 05/17/2010    Meningococcal MCV4P 06/19/2020    Meningococcal, Unknown Serogroups 05/01/2017    Pneumococcal Polysaccharide PPV23 2004, 2004, 04/11/2005, 06/30/2005    Tdap 05/01/2017    Varicella 09/22/2005, 01/14/2012       Depression Screening and Follow-up Plan: Patient was screened for depression during today's encounter. They screened negative with a PHQ-2 score of 0.        WENDI Whitlock

## 2024-10-31 PROBLEM — M54.50 CHRONIC MIDLINE LOW BACK PAIN WITHOUT SCIATICA: Status: ACTIVE | Noted: 2024-10-31

## 2024-10-31 PROBLEM — M25.561 CHRONIC PAIN OF RIGHT KNEE: Status: ACTIVE | Noted: 2024-10-31

## 2024-10-31 PROBLEM — G89.29 CHRONIC PAIN OF RIGHT KNEE: Status: ACTIVE | Noted: 2024-10-31

## 2024-10-31 PROBLEM — G89.29 CHRONIC MIDLINE LOW BACK PAIN WITHOUT SCIATICA: Status: ACTIVE | Noted: 2024-10-31

## 2024-10-31 LAB — ANA SER QL IA: NEGATIVE

## 2024-11-06 ENCOUNTER — OFFICE VISIT (OUTPATIENT)
Dept: OBGYN CLINIC | Facility: HOSPITAL | Age: 19
End: 2024-11-06
Payer: COMMERCIAL

## 2024-11-06 ENCOUNTER — HOSPITAL ENCOUNTER (OUTPATIENT)
Dept: RADIOLOGY | Facility: HOSPITAL | Age: 19
Discharge: HOME | End: 2024-11-06
Attending: ORTHOPAEDIC SURGERY
Payer: COMMERCIAL

## 2024-11-06 VITALS
WEIGHT: 147.05 LBS | HEIGHT: 66 IN | HEART RATE: 75 BPM | DIASTOLIC BLOOD PRESSURE: 82 MMHG | BODY MASS INDEX: 23.63 KG/M2 | SYSTOLIC BLOOD PRESSURE: 129 MMHG

## 2024-11-06 DIAGNOSIS — R52 PAIN: Primary | ICD-10-CM

## 2024-11-06 DIAGNOSIS — R52 PAIN: ICD-10-CM

## 2024-11-06 DIAGNOSIS — G89.29 CHRONIC MIDLINE LOW BACK PAIN WITHOUT SCIATICA: ICD-10-CM

## 2024-11-06 DIAGNOSIS — M54.50 CHRONIC MIDLINE LOW BACK PAIN WITHOUT SCIATICA: ICD-10-CM

## 2024-11-06 PROCEDURE — 99204 OFFICE O/P NEW MOD 45 MIN: CPT | Performed by: ORTHOPAEDIC SURGERY

## 2024-11-06 PROCEDURE — 72100 X-RAY EXAM L-S SPINE 2/3 VWS: CPT

## 2024-11-06 NOTE — PROGRESS NOTES
Assessment & Plan/Medical Decision Makin y.o. female with Back Pain and imaging findings most notable for mild lumbar spondylosis        The clinical, physical and imaging findings were reviewed with the patient.  Betsy  has a constellation of findings consistent with low back and tailbone pain.  Fortunately patient remains neurologically intact and functional. Physical exam showing no weakness, no TTP - however continues with episodes of severe pain on a daily basis.   We discussed the treatment options including physical therapy, at home exercises, activity modifications, chiropractic medicine, oral medications, interventional spine procedures.  At this time recommend continued conservative treatments.    Given ongoing symptoms without any significant XR findings, will plan to obtain MRI pelvis spine to further evaluate patient's symptoms. Will review MRI in detail with patient at follow-up visit and discuss further treatment options at that time.    Would also recommend patient follow up with her OB/GYN specialist for further evaluation.    Patient instructed to return to office/ER sooner if symptoms are not improving, getting worse, or new worrisome/neurologic symptoms arise.  Patient will follow up after MRI     Subjective:      Chief Complaint: Back Pain    HPI:  Betsy Vivar is a 19 y.o. female presenting for initial visit with chief complaint of back pain - referred by Gail Zepeda. Ongoing back and tailbone pain since May without injury. Pain is worse when going from bending to standing position and when doing exercises such as hip bridges. Also gets aggravated when lifting up objects. Pain is not constant but is debilitating when it occurs. Some pain with prolonged sitting. Pain will sometimes radiate into her abdomen. Denies radiation of pain into her lower extremities. Denies numbness or tingling. Denies lower extremity weakness. Denies any nitish trauma. Denies fever or chills, no night  sweats. Denies any bladder or bowel changes.      Conservative therapy includes the following:   Medications: motrin as needed    Injections: denies     Physical Therapy: denies  Chiropractic Medicine: has attempted  Accupunture/Massage Therapy: has not attempted   These therapeutic modalities were ineffective at providing sustained pain relief/functional improvement.     Nicotine dependent: denies  Occupation: [employer], and throws [employer]  Living situation: Lives with family   ADLs: patient is able to perform     Objective:     Family History   Problem Relation Age of Onset    Anxiety disorder Mother     Endometriosis Mother     Migraines Mother     Seizures Mother     Chiari malformation Mother     No Known Problems Father     Anxiety disorder Sister     Irritable bowel syndrome Sister     Diabetes Maternal Grandmother     Hypertension Maternal Grandfather     Diabetes Paternal Grandmother     Mental illness Neg Hx     Addiction problem Neg Hx        Past Medical History:   Diagnosis Date    Headache     Ovarian cyst        Current Outpatient Medications   Medication Sig Dispense Refill    Multiple Vitamin (multivitamin) capsule Take 1 capsule by mouth daily      Sodium Fluoride 5000 PPM 1.1 % PSTE USE LIKE TOOTHPASTE. DO NOT RINSE, DRINK OR EAT FOR 30 MINUTES AFTER USE.       No current facility-administered medications for this visit.       Past Surgical History:   Procedure Laterality Date    FL INJECTION LEFT SHOULDER (ARTHROGRAM)  12/27/2023    [place] SURGICAL ARTHROSCOPY SHOULDER REPAIR SLAP LESION Left 2/2/2024    Procedure: ARTHROSCOPY SHOULDER- left Diagnostic;  Surgeon: Sylvia Clayton DO;  Location:  MAIN OR;  Service: Orthopedics    WISDOM TOOTH EXTRACTION Bilateral     bottom wisdom teeth removed       Social History     Socioeconomic History    Marital status: Single     Spouse name: Not on file    Number of children: Not on file    Years of education: Not on file    Highest education level: Not on file  "  Occupational History    Not on file   Tobacco Use    Smoking status: Never     Passive exposure: Never    Smokeless tobacco: Never   Vaping Use    Vaping status: Never Used   Substance and Sexual Activity    Alcohol use: Not Currently    Drug use: Never    Sexual activity: Never     Partners: Male   Other Topics Concern    Not on file   Social History Narrative    Not on file     Social Determinants of Health     Financial Resource Strain: Not on file   Food Insecurity: Not on file   Transportation Needs: Not on file   Physical Activity: Not on file   Stress: Not on file   Social Connections: Not on file   Intimate Partner Violence: Not on file   Housing Stability: Not on file       Allergies   Allergen Reactions    Procaine Rash       Review of Systems  General- denies fever/chills  HEENT- denies hearing loss or sore throat  Eyes- denies eye pain or visual disturbances, denies red eyes  Respiratory- denies cough or SOB  Cardio- denies chest pain or palpitations  GI- denies abdominal pain  Endocrine- denies urinary frequency  Urinary- denies pain with urination  Musculoskeletal- Negative except noted above  Skin- denies rashes or wounds  Neurological- denies dizziness or headache  Psychiatric- denies anxiety or difficulty concentrating    Physical Exam  /82   Pulse 75   Ht 5' 6\" (1.676 m)   Wt 66.7 kg (147 lb 0.8 oz)   BMI 23.73 kg/m²     General/Constitutional: No apparent distress: well-nourished and well developed.  Lymphatic: No appreciable lymphadenopathy  Respiratory: Non-labored breathing  Vascular: No edema, swelling or tenderness, except as noted in detailed exam.  Integumentary: No impressive skin lesions present, except as noted in detailed exam.  Psych: Normal mood and affect, oriented to person, place and time.  MSK: normal other than stated in HPI and exam  Gait & balance: no evidence of myelopathic gait, ambulates Independently     Lumbar spine range of motion:  -Forward flexion to " "90  -Extension to neutral  -Lateral bend 25 right, 25 left  -Rotation 25 right, 25 left  There tenderness with palpation along lumbar paraspinal musculature, no midline tenderness     Neurologic:  Lower Extremity Motor Function    Right  Left    Iliopsoas  5/5  5/5    Quadriceps 5/5 5/5   Tibialis anterior  5/5  5/5    EHL  5/5  5/5    Gastroc. muscle  5/5  5/5    Heel rise  5/5  5/5    Toe rise  5/5  5/5      Sensory: light touch is intact to bilateral upper and lower extremities     Reflexes: intact     Other tests:  Straight Leg Raise: negative  Akira SI: negative  WERO SI: negative  Greater troch: no tenderness   Internal/external hip ROM: intact, no pain   Flexion/extension knee ROM: intact, no pain   Vascular: WWP extremities, 2+DP bilateral      Diagnostic Tests   IMAGING: I have personally reviewed the images and these are my findings:  Lumbar Spine X-rays from 11/6/2024: mild lumbar spondylosis, disc height well maintained, no apparent spondylolisthesis, no appreciated lytic/blastic lesions, no obvious instability    Electronic Medical Records were reviewed including office notes, imaging studies    Procedures, if performed today     None performed       Portions of the record may have been created with voice recognition software.  Occasional wrong word or \"sound a like\" substitutions may have occurred due to the inherent limitations of voice recognition software.  Read the chart carefully and recognize, using context, where substitutions have occurred.  "

## 2024-11-06 NOTE — LETTER
2024     WENDI Whitlock  [address] [address]    Patient: Betsy Vivar   YOB: 2005   Date of Visit: 2024       Dear Dr. Zepeda:    Thank you for referring Betsy Vivar to me for evaluation. Below are my notes for this consultation.    If you have questions, please do not hesitate to call me. I look forward to following your patient along with you.         Sincerely,        Abdulaziz Feliciano MD        CC: No Recipients    Abdulaziz Feliciano MD  2024  7:31 AM  Sign when Signing Visit  Assessment & Plan/Medical Decision Makin y.o. female with Back Pain and imaging findings most notable for mild lumbar spondylosis        The clinical, physical and imaging findings were reviewed with the patient.  Betsy  has a constellation of findings consistent with low back and tailbone pain.  Fortunately patient remains neurologically intact and functional. Physical exam showing no weakness, no TTP - however continues with episodes of severe pain on a daily basis.   We discussed the treatment options including physical therapy, at home exercises, activity modifications, chiropractic medicine, oral medications, interventional spine procedures.  At this time recommend continued conservative treatments.    Given ongoing symptoms without any significant XR findings, will plan to obtain MRI pelvis spine to further evaluate patient's symptoms. Will review MRI in detail with patient at follow-up visit and discuss further treatment options at that time.    Would also recommend patient follow up with her OB/GYN specialist for further evaluation.    Patient instructed to return to office/ER sooner if symptoms are not improving, getting worse, or new worrisome/neurologic symptoms arise.  Patient will follow up after MRI     Subjective:      Chief Complaint: Back Pain    HPI:  Betsy Vivar is a 19 y.o. female presenting for initial visit with chief  complaint of back pain - referred by Gail Zepeda. Ongoing back and tailbone pain since May without injury. Pain is worse when going from bending to standing position and when doing exercises such as hip bridges. Also gets aggravated when lifting up objects. Pain is not constant but is debilitating when it occurs. Some pain with prolonged sitting. Pain will sometimes radiate into her abdomen. Denies radiation of pain into her lower extremities. Denies numbness or tingling. Denies lower extremity weakness. Denies any nitish trauma. Denies fever or chills, no night sweats. Denies any bladder or bowel changes.      Conservative therapy includes the following:   Medications: motrin as needed    Injections: denies     Physical Therapy: denies  Chiropractic Medicine: has attempted  Accupunture/Massage Therapy: has not attempted   These therapeutic modalities were ineffective at providing sustained pain relief/functional improvement.     Nicotine dependent: denies  Occupation: [employer], and throws axes  Living situation: Lives with family   ADLs: patient is able to perform     Objective:     Family History   Problem Relation Age of Onset   • Anxiety disorder Mother    • Endometriosis Mother    • Migraines Mother    • Seizures Mother    • Chiari malformation Mother    • No Known Problems Father    • Anxiety disorder Sister    • Irritable bowel syndrome Sister    • Diabetes Maternal Grandmother    • Hypertension Maternal Grandfather    • Diabetes Paternal Grandmother    • Mental illness Neg Hx    • Addiction problem Neg Hx        Past Medical History:   Diagnosis Date   • Headache    • Ovarian cyst        Current Outpatient Medications   Medication Sig Dispense Refill   • Multiple Vitamin (multivitamin) capsule Take 1 capsule by mouth daily     • Sodium Fluoride 5000 PPM 1.1 % PSTE USE LIKE TOOTHPASTE. DO NOT RINSE, DRINK OR EAT FOR 30 MINUTES AFTER USE.       No current facility-administered medications for this visit.  "      Past Surgical History:   Procedure Laterality Date   • FL INJECTION LEFT SHOULDER (ARTHROGRAM)  12/27/2023   • [place] SURGICAL ARTHROSCOPY SHOULDER REPAIR SLAP LESION Left 2/2/2024    Procedure: ARTHROSCOPY SHOULDER- left Diagnostic;  Surgeon: Sylvia Clayton DO;  Location:  MAIN OR;  Service: Orthopedics   • WISDOM TOOTH EXTRACTION Bilateral     bottom wisdom teeth removed       Social History     Socioeconomic History   • Marital status: Single     Spouse name: Not on file   • Number of children: Not on file   • Years of education: Not on file   • Highest education level: Not on file   Occupational History   • Not on file   Tobacco Use   • Smoking status: Never     Passive exposure: Never   • Smokeless tobacco: Never   Vaping Use   • Vaping status: Never Used   Substance and Sexual Activity   • Alcohol use: Not Currently   • Drug use: Never   • Sexual activity: Never     Partners: Male   Other Topics Concern   • Not on file   Social History Narrative   • Not on file     Social Determinants of Health     Financial Resource Strain: Not on file   Food Insecurity: Not on file   Transportation Needs: Not on file   Physical Activity: Not on file   Stress: Not on file   Social Connections: Not on file   Intimate Partner Violence: Not on file   Housing Stability: Not on file       Allergies   Allergen Reactions   • Procaine Rash       Review of Systems  General- denies fever/chills  HEENT- denies hearing loss or sore throat  Eyes- denies eye pain or visual disturbances, denies red eyes  Respiratory- denies cough or SOB  Cardio- denies chest pain or palpitations  GI- denies abdominal pain  Endocrine- denies urinary frequency  Urinary- denies pain with urination  Musculoskeletal- Negative except noted above  Skin- denies rashes or wounds  Neurological- denies dizziness or headache  Psychiatric- denies anxiety or difficulty concentrating    Physical Exam  /82   Pulse 75   Ht 5' 6\" (1.676 m)   Wt 66.7 kg (147 lb " "0.8 oz)   BMI 23.73 kg/m²     General/Constitutional: No apparent distress: well-nourished and well developed.  Lymphatic: No appreciable lymphadenopathy  Respiratory: Non-labored breathing  Vascular: No edema, swelling or tenderness, except as noted in detailed exam.  Integumentary: No impressive skin lesions present, except as noted in detailed exam.  Psych: Normal mood and affect, oriented to person, place and time.  MSK: normal other than stated in HPI and exam  Gait & balance: no evidence of myelopathic gait, ambulates Independently     Lumbar spine range of motion:  -Forward flexion to 90  -Extension to neutral  -Lateral bend 25 right, 25 left  -Rotation 25 right, 25 left  There tenderness with palpation along lumbar paraspinal musculature, no midline tenderness     Neurologic:  Lower Extremity Motor Function    Right  Left    Iliopsoas  5/5  5/5    Quadriceps 5/5 5/5   Tibialis anterior  5/5  5/5    EHL  5/5  5/5    Gastroc. muscle  5/5  5/5    Heel rise  5/5  5/5    Toe rise  5/5  5/5      Sensory: light touch is intact to bilateral upper and lower extremities     Reflexes: intact     Other tests:  Straight Leg Raise: negative  Akira SI: negative  WERO SI: negative  Greater troch: no tenderness   Internal/external hip ROM: intact, no pain   Flexion/extension knee ROM: intact, no pain   Vascular: WWP extremities, 2+DP bilateral      Diagnostic Tests   IMAGING: I have personally reviewed the images and these are my findings:  Lumbar Spine X-rays from 11/6/2024: mild lumbar spondylosis, disc height well maintained, no apparent spondylolisthesis, no appreciated lytic/blastic lesions, no obvious instability    Electronic Medical Records were reviewed including office notes, imaging studies    Procedures, if performed today     None performed       Portions of the record may have been created with voice recognition software.  Occasional wrong word or \"sound a like\" substitutions may have occurred due to the " inherent limitations of voice recognition software.  Read the chart carefully and recognize, using context, where substitutions have occurred.

## 2024-11-07 LAB — HLA-B27 QL NAA+PROBE: NEGATIVE

## 2024-11-20 ENCOUNTER — HOSPITAL ENCOUNTER (OUTPATIENT)
Dept: MRI IMAGING | Facility: HOSPITAL | Age: 19
Discharge: HOME | End: 2024-11-20
Payer: COMMERCIAL

## 2024-11-20 DIAGNOSIS — M54.50 CHRONIC MIDLINE LOW BACK PAIN WITHOUT SCIATICA: ICD-10-CM

## 2024-11-20 DIAGNOSIS — G89.29 CHRONIC MIDLINE LOW BACK PAIN WITHOUT SCIATICA: ICD-10-CM

## 2024-11-20 PROCEDURE — 72195 MRI PELVIS W/O DYE: CPT

## 2024-12-02 ENCOUNTER — TELEPHONE (OUTPATIENT)
Dept: OBGYN CLINIC | Facility: HOSPITAL | Age: 19
End: 2024-12-02

## 2024-12-02 NOTE — TELEPHONE ENCOUNTER
Called and left message. Dr. Feliciano will be out of office during apt time. Patient will be seeing his DEVI Eaton

## 2025-01-08 ENCOUNTER — OFFICE VISIT (OUTPATIENT)
Dept: OBGYN CLINIC | Facility: HOSPITAL | Age: 20
End: 2025-01-08
Payer: COMMERCIAL

## 2025-01-08 VITALS — BODY MASS INDEX: 23.63 KG/M2 | WEIGHT: 147.05 LBS | HEIGHT: 66 IN

## 2025-01-08 DIAGNOSIS — M54.16 LUMBAR RADICULOPATHY: ICD-10-CM

## 2025-01-08 DIAGNOSIS — M54.50 LUMBAR BACK PAIN: Primary | ICD-10-CM

## 2025-01-08 PROCEDURE — 99212 OFFICE O/P EST SF 10 MIN: CPT

## 2025-01-08 NOTE — PROGRESS NOTES
Assessment & Plan/Medical Decision Makin y.o. female with Back Pain and imaging findings most notable for mild lumbar spondylosis        The clinical, physical and imaging findings were reviewed with the patient.  Betsy  has a constellation of findings consistent with low back and tailbone pain.  Fortunately patient remains neurologically intact and functional.     Physical exam showing no weakness, no significant TTP.   We discussed the treatment options including physical therapy, at home exercises, activity modifications, chiropractic medicine, oral medications, interventional spine procedures.  At this time recommend continued conservative treatments.    Reviewed MRI pelvis in detail with patient. No abnormality seen. However, patient continues with daily low back and tailbone pain. Also with intermittent numbness/tingling into her left lower extremity. She has been  going to a chiropractor and massage therapist regularly without lasting relief. Will plan to obtain MRI lumbar spine to further evaluate patient's symptoms. Will review MRI in detail with patient at follow-up visit and discuss further treatment options at that time.    Referral placed for physical therapy to work on core strengthening, lumbar ROM, strengthening, and stretching exercises.    Patient instructed to return to office/ER sooner if symptoms are not improving, getting worse, or new worrisome/neurologic symptoms arise.  Patient will follow up after lumbar MRI.     Subjective:      Chief Complaint: Back Pain    HPI:  Betsy Vivar is a 19 y.o. female presenting for initial visit with chief complaint of back pain - referred by Gail Zepeda. Ongoing back and tailbone pain since May without injury. Pain is worse when going from bending to standing position and when doing exercises such as hip bridges. Also gets aggravated when lifting up objects. Pain is not constant but is debilitating when it occurs. Some pain with prolonged  sitting. Pain will sometimes radiate into her abdomen. Denies radiation of pain into her lower extremities. Denies numbness or tingling. Denies lower extremity weakness. Denies any nitish trauma. Denies fever or chills, no night sweats. Denies any bladder or bowel changes.      Conservative therapy includes the following:   Medications: motrin as needed    Injections: denies     Physical Therapy: denies  Chiropractic Medicine: has attempted  Accupunture/Massage Therapy: has not attempted   These therapeutic modalities were ineffective at providing sustained pain relief/functional improvement.     Nicotine dependent: denies  Occupation: [employer], and throws axes  Living situation: Lives with family   ADLs: patient is able to perform     Update HPI on 1/8/2024:  Betsy is here for follow up, pelvic MRI review. Continues with low back and tailbone pain. Pain is present and bothersome on a daily basis and will become severe at times. Denies gluteal region pain. Does report numbness/tingling sensation into medial aspect of left lower extremity to the level of her ankle. Symptoms occur with prolonged sitting and with certain activities and exercises like hip bridges. She does report possible family history of Shannon Danlos Syndrome. No issues with standing or walking. She has been undergoing cranial-sacral therapy with some temporary relief. Has also undergone muscle-touch kinesiology.    Objective:     Family History   Problem Relation Age of Onset   • Anxiety disorder Mother    • Endometriosis Mother    • Migraines Mother    • Seizures Mother    • Chiari malformation Mother    • No Known Problems Father    • Anxiety disorder Sister    • Irritable bowel syndrome Sister    • Diabetes Maternal Grandmother    • Hypertension Maternal Grandfather    • Diabetes Paternal Grandmother    • Mental illness Neg Hx    • Addiction problem Neg Hx        Past Medical History:   Diagnosis Date   • Headache    • Ovarian cyst        Current  Outpatient Medications   Medication Sig Dispense Refill   • Multiple Vitamin (multivitamin) capsule Take 1 capsule by mouth daily     • Sodium Fluoride 5000 PPM 1.1 % PSTE USE LIKE TOOTHPASTE. DO NOT RINSE, DRINK OR EAT FOR 30 MINUTES AFTER USE.       No current facility-administered medications for this visit.       Past Surgical History:   Procedure Laterality Date   • FL INJECTION LEFT SHOULDER (ARTHROGRAM)  12/27/2023   • [place] SURGICAL ARTHROSCOPY SHOULDER REPAIR SLAP LESION Left 2/2/2024    Procedure: ARTHROSCOPY SHOULDER- left Diagnostic;  Surgeon: Sylvia Clayton DO;  [facility];  Service: Orthopedics   • WISDOM TOOTH EXTRACTION Bilateral     bottom wisdom teeth removed       Social History     Socioeconomic History   • Marital status: Single     Spouse name: Not on file   • Number of children: Not on file   • Years of education: Not on file   • Highest education level: Not on file   Occupational History   • Not on file   Tobacco Use   • Smoking status: Never     Passive exposure: Never   • Smokeless tobacco: Never   Vaping Use   • Vaping status: Never Used   Substance and Sexual Activity   • Alcohol use: Not Currently   • Drug use: Never   • Sexual activity: Never     Partners: Male   Other Topics Concern   • Not on file   Social History Narrative   • Not on file     Social Drivers of Health     Financial Resource Strain: Not on file   Food Insecurity: Not on file   Transportation Needs: Not on file   Physical Activity: Not on file   Stress: Not on file   Social Connections: Not on file   Intimate Partner Violence: Not on file   Housing Stability: Not on file       Allergies   Allergen Reactions   • Procaine Rash       Review of Systems  General- denies fever/chills  HEENT- denies hearing loss or sore throat  Eyes- denies eye pain or visual disturbances, denies red eyes  Respiratory- denies cough or SOB  Cardio- denies chest pain or palpitations  GI- denies abdominal pain  Endocrine- denies urinary  "frequency  Urinary- denies pain with urination  Musculoskeletal- Negative except noted above  Skin- denies rashes or wounds  Neurological- denies dizziness or headache  Psychiatric- denies anxiety or difficulty concentrating    Physical Exam  Ht 5' 6\" (1.676 m)   Wt 66.7 kg (147 lb 0.8 oz)   BMI 23.73 kg/m²     General/Constitutional: No apparent distress: well-nourished and well developed.  Lymphatic: No appreciable lymphadenopathy  Respiratory: Non-labored breathing  Vascular: No edema, swelling or tenderness, except as noted in detailed exam.  Integumentary: No impressive skin lesions present, except as noted in detailed exam.  Psych: Normal mood and affect, oriented to person, place and time.  MSK: normal other than stated in HPI and exam  Gait & balance: no evidence of myelopathic gait, ambulates Independently     Lumbar spine range of motion:  -Forward flexion to 90  -Extension to neutral  -Lateral bend 25 right, 25 left  -Rotation 25 right, 25 left  There tenderness with palpation along lumbar paraspinal musculature, no midline tenderness     Neurologic:  Lower Extremity Motor Function    Right  Left    Iliopsoas  5/5  5/5    Quadriceps 5/5 5/5   Tibialis anterior  5/5  5/5    EHL  5/5  5/5    Gastroc. muscle  5/5  5/5    Heel rise  5/5  5/5    Toe rise  5/5  5/5      Sensory: light touch is intact to bilateral upper and lower extremities     Reflexes: intact     Other tests:  Straight Leg Raise: negative  Akira SI: negative  WERO SI: negative  Greater troch: no tenderness   Internal/external hip ROM: intact, no pain   Flexion/extension knee ROM: intact, no pain   Vascular: WWP extremities, 2+DP bilateral      Diagnostic Tests   IMAGING: I have personally reviewed the images and these are my findings:  Lumbar Spine X-rays from 11/6/2024: mild lumbar spondylosis, disc height well maintained, no apparent spondylolisthesis, no appreciated lytic/blastic lesions, no obvious instability    Pelvic MRI from " "11/20/2024: no acute fracture or osseous abnormality, visualized lumbar spine without significant degenerative changes    Electronic Medical Records were reviewed including office notes, imaging studies    Procedures, if performed today     None performed       Portions of the record may have been created with voice recognition software.  Occasional wrong word or \"sound a like\" substitutions may have occurred due to the inherent limitations of voice recognition software.  Read the chart carefully and recognize, using context, where substitutions have occurred.  "

## 2025-01-09 ENCOUNTER — TELEPHONE (OUTPATIENT)
Age: 20
End: 2025-01-09

## 2025-01-09 NOTE — TELEPHONE ENCOUNTER
Caller: patient    Doctor: Jodie    Reason for call: please fax MRI order to [facility] tel# [phone]. Pt did not get a fax number    Call back#: [phone]

## 2025-01-13 DIAGNOSIS — G89.29 CHRONIC MIDLINE LOW BACK PAIN WITHOUT SCIATICA: ICD-10-CM

## 2025-01-13 DIAGNOSIS — M54.50 CHRONIC MIDLINE LOW BACK PAIN WITHOUT SCIATICA: ICD-10-CM

## 2025-01-13 DIAGNOSIS — M54.50 LUMBAR BACK PAIN: Primary | ICD-10-CM

## 2025-01-13 DIAGNOSIS — M54.16 LUMBAR RADICULOPATHY: ICD-10-CM

## 2025-01-13 RX ORDER — CYCLOBENZAPRINE HCL 10 MG
10 TABLET ORAL 2 TIMES DAILY PRN
Qty: 30 TABLET | Refills: 0 | Status: SHIPPED | OUTPATIENT
Start: 2025-01-13

## 2025-01-13 RX ORDER — ALPRAZOLAM 0.25 MG/1
0.25 TABLET ORAL AS NEEDED
Qty: 2 TABLET | Refills: 0 | Status: SHIPPED | OUTPATIENT
Start: 2025-01-13

## 2025-01-14 ENCOUNTER — EVALUATION (OUTPATIENT)
Dept: PHYSICAL THERAPY | Facility: MEDICAL CENTER | Age: 20
End: 2025-01-14
Payer: COMMERCIAL

## 2025-01-14 DIAGNOSIS — M54.50 LUMBAR BACK PAIN: ICD-10-CM

## 2025-01-14 DIAGNOSIS — M54.16 LUMBAR RADICULOPATHY: ICD-10-CM

## 2025-01-14 PROCEDURE — 97110 THERAPEUTIC EXERCISES: CPT | Performed by: PHYSICAL THERAPIST

## 2025-01-14 PROCEDURE — 97161 PT EVAL LOW COMPLEX 20 MIN: CPT | Performed by: PHYSICAL THERAPIST

## 2025-01-14 NOTE — PROGRESS NOTES
PT Evaluation   IE:   POC:  - 25  Re-eval:     Today's date: 2025  Patient name: Betsy Vivar  : 2005  MRN: [id number]  Referring provider: Uma Hatch  Dx:   Encounter Diagnosis     ICD-10-CM    1. Lumbar back pain  M54.50 Ambulatory referral to Physical Therapy      2. Lumbar radiculopathy  M54.16 Ambulatory referral to Physical Therapy          Start Time: 1750  Stop Time: 183  Total time in clinic (min): 45 minutes    Assessment  Impairments: abnormal movement, activity intolerance, impaired physical strength, lacks appropriate home exercise program, pain with function, poor body mechanics, participation limitations and activity limitations  Symptom irritability: moderate    Assessment details: Betsy is a 20 y/o female who presents to the clinic with a  referral diagnosis of lumbar back pain and lumbar radiculopathy.  heir rehab potential is good. Key impairments include A pinpoint pain at her tailbone and a radiating discomfort down her leg to her ankle. Test and measures revealed normal ROM, increased joint play, good strength, and a positive response to repeated extension exercises. The patients symptoms and test results are consistent with a lumber derangement and lumber radiculopathy. The primary goal of the patient is To get back to her active lifestyle pain free. This patient would benefit from skilled PT to address their impairments and reach their goals. The patient was educated on their condition and on their HEP, expressing understanding of the topics.    Understanding of Dx/Px/POC: good     Prognosis: good    Goals  Short Term Goals:  In 2-4 weeks, the patient will:  1. Patient will report at least a 25% reduction in subjective pain complaints/symptoms to better manage ADLs and work duties.  2. Patient will be able to self-reduce their pain by adhering to HEP exercises to demonstrate independence in program and the efficacy of the HEP.  3. Patient will  be able to perform sumo squats without any pain or discomfort to demonstrate progress in symptom reduction.      Long Term Goals:  In 6-8 weeks, the patient will:  1. Pt will report no symptoms after two full days of work.  2. FOTO to greater than predicted value.  3. Pt will be able to sit for over 30 minutes without experiencing a re-creation of radiating back pain indicating her return to work duties and daily functions.  4. Pt will be able to perform ADLs, iADLS, and household duties with minimal restriction indicating return to PLOF.  5. Pt independent with rationale, technique and plan for performance of advanced HEP to ensure independent self-management of symptoms upon discharge.    Plan  Patient would benefit from: skilled physical therapy and home program  Planned modality interventions: traction, TENS and thermotherapy: hydrocollator packs    Planned therapy interventions: abdominal trunk stabilization, activity modification, joint mobilization, manual therapy, body mechanics training, nerve gliding, neuromuscular re-education, patient/caregiver education, postural training, strengthening, stretching, therapeutic activities, functional ROM exercises, graded exercise, home exercise program and therapeutic exercise    Frequency: 2x week  Duration in weeks: 12  Plan of Care beginning date: 1/14/2025  Plan of Care expiration date: 4/8/2025  Treatment plan discussed with: patient        Subjective Evaluation    History of Present Illness  Mechanism of injury: Betsy is a 18 y/o female that complains of low back pain near her tailbone and radiating pain down her left leg that began in May but got worse around September. The mechanism of injury is unknown and occurs when the patient sits for an extended period of time or squats down. The patient's primary symptoms are pain near the tailbone that radiate into the ankle. This impacts their ability to perform the duties required at her two jobs that require  bending, lifting, and sitting.The symptoms have fluctuated since initial onset but have been worse as of late. Sitting, bending down to lift, squatting makes symptoms worse. Walking around makes symptoms better. They have tried previous treatments such as going to a cranial-sacral therapist and taking ibuprofen which have been not very effective. The patients PMH identifies nothing significant to this injury.          Recurrent probem    Quality of life: good    Patient Goals  Patient goal: To work without pain, to get back to lifting at the gym and be painfree in her active lifestyle  Pain  Current pain ratin  At best pain ratin  At worst pain ratin  Location: Tailbone and down left leg  Quality: radiating, tight and discomfort  Relieving factors: rest (Moving round)  Aggravating factors: sitting and lifting  Progression: worsening    Treatments  Previous treatment: medication (cranial-sacral therapy)        Objective     Postural Observations  Seated posture: fair  Standing posture: good  Correction of posture: has no consistent effect      Active Range of Motion     Lumbar   Flexion:  WFL  Extension:  WFL  Left lateral flexion:  WFL  Right lateral flexion:  WFL    Joint Play   Joints within functional limits: L5 and S1   L5 comments: A bit looser but not hypermobile  S1 comments: A bit looser but not hypermobile    Tests     Additional Tests Details  MDT: Reproducible sign is performing a sumo squat  Prone on elbows (no change)  Prone press ups (no change)  Prone press ups with patient overpressure (no change)  Prone press ups with clinician overpressure (decrease in symptoms  Clinician A-P mobilizations (further decrease in symptoms)    Home exercise program after IE:  -Prone press ups with patient over pressure (exhale) 5x day, 3x10  -Sit in chair with towel roll behind back         Precautions: Standard precautions  Past Medical History:   Diagnosis Date    Headache     Ovarian cyst             Manuals             A-P mobs                                                    Neuro Re-Ed             Prone press ups             TB rows             TB shoulder extensions             Paloff press             Bridges                                                    Ther Ex             Hamstring stretch with strap                                                                                                        Ther Activity                                       Gait Training                                       Modalities

## 2025-01-20 ENCOUNTER — OFFICE VISIT (OUTPATIENT)
Dept: PHYSICAL THERAPY | Facility: MEDICAL CENTER | Age: 20
End: 2025-01-20
Payer: COMMERCIAL

## 2025-01-20 DIAGNOSIS — M54.50 LUMBAR BACK PAIN: Primary | ICD-10-CM

## 2025-01-20 DIAGNOSIS — M54.16 LUMBAR RADICULOPATHY: ICD-10-CM

## 2025-01-20 PROCEDURE — 97112 NEUROMUSCULAR REEDUCATION: CPT | Performed by: PHYSICAL THERAPIST

## 2025-01-20 PROCEDURE — 97110 THERAPEUTIC EXERCISES: CPT | Performed by: PHYSICAL THERAPIST

## 2025-01-20 PROCEDURE — 97140 MANUAL THERAPY 1/> REGIONS: CPT | Performed by: PHYSICAL THERAPIST

## 2025-01-20 NOTE — PROGRESS NOTES
Daily Note   IE:   POC:  - 25  Re-eval:     Today's date: 2025  Patient name: Betsy Vivar  : 2005  MRN: [id number]  Referring provider: Uma Hatch  Dx:   Encounter Diagnosis     ICD-10-CM    1. Lumbar back pain  M54.50       2. Lumbar radiculopathy  M54.16           Start Time: 1000  Stop Time: 1045  Total time in clinic (min): 45 minutes    Subjective: Still feels the pain especially when sitting and when shoveling this weekend. The exercises have been helpful however and the pain is not as bad as it has been. The radiculopathy was not present.       Objective: See treatment diary below      Assessment: Tolerated treatment well. The repeated extensions continue to show positive results when done after triggering movements or exercises. Core stability and TrA contractions during exercises were added to adjunct the extension based exercises. Pt is progressing well and the frequency of exercises and HEP will be continued. Patient exhibited good technique with therapeutic exercises and would benefit from continued PT      Plan: Continue per plan of care.  Progress treatment as tolerated.    Goals  Short Term Goals:  In 2-4 weeks, the patient will:  1. Patient will report at least a 25% reduction in subjective pain complaints/symptoms to better manage ADLs and work duties.  2. Patient will be able to self-reduce their pain by adhering to HEP exercises to demonstrate independence in program and the efficacy of the HEP.  3. Patient will be able to perform sumo squats without any pain or discomfort to demonstrate progress in symptom reduction.      Long Term Goals:  In 6-8 weeks, the patient will:  1. Pt will report no symptoms after two full days of work.  2. FOTO to greater than predicted value.  3. Pt will be able to sit for over 30 minutes without experiencing a re-creation of radiating back pain indicating her return to work duties and daily functions.  4. Pt will be able  to perform ADLs, iADLS, and household duties with minimal restriction indicating return to PLOF.  5. Pt independent with rationale, technique and plan for performance of advanced HEP to ensure independent self-management of symptoms upon discharge.     Precautions: Standard precautions  Past Medical History:   Diagnosis Date    Headache     Ovarian cyst            Manuals 1/20            A-P mobs 8 min                                                   Neuro Re-Ed             Prone press ups  1x10 between each exercise            TB rows 3x 10            TB shoulder extensions 3x 10            Paloff press 3x 10            Bridges 3x10                                                   Ther Ex             Hamstring stretch with strap 5x 20 sec each side            Kick backs with weight             Marching in place 2 min                                                                              Ther Activity                                       Gait Training                                       Modalities

## 2025-01-21 ENCOUNTER — TELEPHONE (OUTPATIENT)
Dept: OBGYN CLINIC | Facility: HOSPITAL | Age: 20
End: 2025-01-21

## 2025-01-21 NOTE — TELEPHONE ENCOUNTER
"\"IN-BASKET MESSAGE SENT TO [facility]\".    Caller: PINEDA      Doctor: galina     Reason for call: Patient is scheduled for 1/22 and asked for the auth to be switched to their NPI      NPI- [id number]   Location- [address] [place] [id number]        Callback: [phone] (Domenic)  "

## 2025-01-21 NOTE — TELEPHONE ENCOUNTER
Caller: Domenic, [facility]    Doctor: Jodie / Judy    Reason for call: Domenic is calling back again in regards to previous messages concerning the MRI auth/NPI. Please advise if nothing is received by 4 PM today (1/21) her apt with them will be cancelled. Please advise.     Call back#: [phone] (Domenic)   Fax: [phone]    NPI- [id number]   Location- [address] [place] [address] sent to [facility].

## 2025-01-22 ENCOUNTER — OFFICE VISIT (OUTPATIENT)
Dept: PHYSICAL THERAPY | Facility: MEDICAL CENTER | Age: 20
End: 2025-01-22
Payer: COMMERCIAL

## 2025-01-22 DIAGNOSIS — M54.50 LUMBAR BACK PAIN: Primary | ICD-10-CM

## 2025-01-22 DIAGNOSIS — M54.16 LUMBAR RADICULOPATHY: ICD-10-CM

## 2025-01-22 PROCEDURE — 97112 NEUROMUSCULAR REEDUCATION: CPT | Performed by: PHYSICAL THERAPIST

## 2025-01-22 PROCEDURE — 97140 MANUAL THERAPY 1/> REGIONS: CPT | Performed by: PHYSICAL THERAPIST

## 2025-01-22 PROCEDURE — 97110 THERAPEUTIC EXERCISES: CPT | Performed by: PHYSICAL THERAPIST

## 2025-01-22 NOTE — PROGRESS NOTES
"Daily Note   IE:   POC:  - 25  Re-eval:     Today's date: 2025  Patient name: Betsy Vivar  : 2005  MRN: [id number]  Referring provider: Uma Hatch  Dx:   Encounter Diagnosis     ICD-10-CM    1. Lumbar back pain  M54.50       2. Lumbar radiculopathy  M54.16           Start Time: 075  Stop Time: 846  Total time in clinic (min): 48 minutes  Eval/Re-Eval POC Expires Auth #/ Referral # Total Visits Start Date Expiration Date Extension Info Visits Limitation       80                                                         1 2 3 4 5 6         7 8 9 10 11 12           13 14 15 16 17 18           19 20 21 22 23 24           25 26 27 28 29 30                Subjective: \"Not too many changes from the other day but I feel less irritable.\"      Objective: See treatment diary below      Assessment: Tolerated treatment well. The repeated extensions and core/pelvis stability exercises continue to show positive results when done after triggering movements or exercises. Pt is progressing well and the frequency of exercises and HEP will be continued. Pt was re-educated on avoiding excessive flexion movements at this time but will be re-integrated later. Patient exhibited good technique with therapeutic exercises and would benefit from continued PT      Plan: Continue per plan of care.  Progress treatment as tolerated.    Goals  Short Term Goals:  In 2-4 weeks, the patient will:  1. Patient will report at least a 25% reduction in subjective pain complaints/symptoms to better manage ADLs and work duties. (10% partially met)  2. Patient will be able to self-reduce their pain by adhering to HEP exercises to demonstrate independence in program and the efficacy of the HEP.  3. Patient will be able to perform sumo squats without any pain or discomfort to demonstrate progress in symptom reduction.      Long Term Goals:  In 6-8 weeks, the patient will:  1. Pt will " report no symptoms after two full days of work.  2. FOTO to greater than predicted value.  3. Pt will be able to sit for over 30 minutes without experiencing a re-creation of radiating back pain indicating her return to work duties and daily functions.  4. Pt will be able to perform ADLs, iADLS, and household duties with minimal restriction indicating return to PLOF.  5. Pt independent with rationale, technique and plan for performance of advanced HEP to ensure independent self-management of symptoms upon discharge.     Precautions: Standard precautions  Past Medical History:   Diagnosis Date    Headache     Ovarian cyst            Manuals 1/20 1/22           A-P mobs 8 min 8 min                                                  Neuro Re-Ed             Prone press ups  1x10 between each exercise 1x10 between each exercise           TB rows 3x10 3x10           TB shoulder extensions 3x 10 3x10           Paloff press 3x 10 2x 10 each side           Bridges 3x10 3x10 3 sec hold TrA cont                                                  Ther Ex             Hamstring stretch with strap 5x 20 sec each side 5x 20 sec each side           Kick backs with weight  2x 15 each side 4# CW           Marching in place 2 min             Adduction squeezes             S/L clamshells w Blue TB                                                    Ther Activity                                       Gait Training                                       Modalities

## 2025-01-27 ENCOUNTER — OFFICE VISIT (OUTPATIENT)
Dept: PHYSICAL THERAPY | Facility: MEDICAL CENTER | Age: 20
End: 2025-01-27
Payer: COMMERCIAL

## 2025-01-27 DIAGNOSIS — M54.50 LUMBAR BACK PAIN: Primary | ICD-10-CM

## 2025-01-27 DIAGNOSIS — M54.16 LUMBAR RADICULOPATHY: ICD-10-CM

## 2025-01-27 PROCEDURE — 97112 NEUROMUSCULAR REEDUCATION: CPT | Performed by: PHYSICAL THERAPIST

## 2025-01-27 PROCEDURE — 97110 THERAPEUTIC EXERCISES: CPT | Performed by: PHYSICAL THERAPIST

## 2025-01-27 NOTE — PROGRESS NOTES
"Daily Note   IE:   POC:  - 25  Re-eval:     Today's date: 2025  Patient name: Betsy Vivar  : 2005  MRN: [id number]  Referring provider: Uma Hatch  Dx:   Encounter Diagnosis     ICD-10-CM    1. Lumbar back pain  M54.50       2. Lumbar radiculopathy  M54.16           Start Time: 959  Stop Time: 1045  Total time in clinic (min): 46 minutes  Eval/Re-Eval POC Expires Auth #/ Referral # Total Visits Start Date Expiration Date Extension Info Visits Limitation       80                                                         1 2 3 4 5 6        7 8 9 10 11 12           13 14 15 16 17 18           19 20 21 22 23 24           25 26 27 28 29 30                Subjective:  \"Sitting down a lot and still able to alleviate it with press ups, fine the next day. 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 The repeated extensions and core/pelvis stability exercises continue to show positive results when done after triggering movements or exercises. Pt is progressing well and she demonstrated proper form with the exercise progressions today. Since sitting remains the pain issue, corset muscle endurance exercises were added.  Patient exhibited good technique with therapeutic exercises and would benefit from continued PT      Plan: Continue per plan of care.  Progress treatment as tolerated.    Goals  Short Term Goals:  In 2-4 weeks, the patient will:  1. Patient will report at least a 25% reduction in subjective pain complaints/symptoms to better manage ADLs and work duties. (10% partially met)  2. Patient will be able to self-reduce their pain by adhering to HEP exercises to demonstrate independence in program and the efficacy of the HEP.  3. Patient will be able to perform sumo squats without any pain or discomfort to demonstrate progress in symptom " reduction.      Long Term Goals:  In 6-8 weeks, the patient will:  1. Pt will report no symptoms after two full days of work.  2. FOTO to greater than predicted value.  3. Pt will be able to sit for over 30 minutes without experiencing a re-creation of radiating back pain indicating her return to work duties and daily functions.  4. Pt will be able to perform ADLs, iADLS, and household duties with minimal restriction indicating return to PLOF.  5. Pt independent with rationale, technique and plan for performance of advanced HEP to ensure independent self-management of symptoms upon discharge.     Precautions: Standard precautions  Past Medical History:   Diagnosis Date    Headache     Ovarian cyst            Manuals 1/20 1/22 1/27          A-P mobs 8 min 8 min NV                                                 Neuro Re-Ed             Prone press ups  1x10 between each exercise 1x10 between each exercise 1x10 between each with clinician overpressure          TB rows 3x10 3x10 1x 30          TB shoulder extensions 3x 10 3x10 1x 30          Paloff press 3x 10 2x 10 each side 8x 10 sec hold each side          Bridges 3x10 3x10 3 sec hold TrA cont 3x10 3 sec hold TrA cont          Bird dogs    2x 20 3 sec hold          Dead bugs   2x 20 3 sec hold                       Ther Ex             Hamstring stretch with strap 5x 20 sec each side 5x 20 sec each side 3x 20 sec each side          Kick backs with weight  2x 15 each side 4# CW           Marching in place 2 min             Adduction squeezes             S/L clamshells w Blue TB             Hooklying hip rotation    2 min                                    Ther Activity                                       Gait Training                                       Modalities

## 2025-01-29 ENCOUNTER — OFFICE VISIT (OUTPATIENT)
Dept: PHYSICAL THERAPY | Facility: MEDICAL CENTER | Age: 20
End: 2025-01-29
Payer: COMMERCIAL

## 2025-01-29 DIAGNOSIS — M54.50 LUMBAR BACK PAIN: Primary | ICD-10-CM

## 2025-01-29 DIAGNOSIS — M54.16 LUMBAR RADICULOPATHY: ICD-10-CM

## 2025-01-29 PROCEDURE — 97112 NEUROMUSCULAR REEDUCATION: CPT | Performed by: PHYSICAL THERAPIST

## 2025-01-29 PROCEDURE — 97110 THERAPEUTIC EXERCISES: CPT | Performed by: PHYSICAL THERAPIST

## 2025-01-29 NOTE — PROGRESS NOTES
"Daily Note   IE:   POC:  - 25  Re-eval:     Today's date: 2025  Patient name: Betsy Vivar  : 2005  MRN: [id number]  Referring provider: Uma Hatch  Dx:   Encounter Diagnosis     ICD-10-CM    1. Lumbar back pain  M54.50       2. Lumbar radiculopathy  M54.16             Start Time: 0800  Stop Time: 0847  Total time in clinic (min): 47 minutes  Eval/Re-Eval POC Expires Auth #/ Referral # Total Visits Start Date Expiration Date Extension Info Visits Limitation       80                                                         1 2 3 4 5 6       7 8 9 10 11 12           13 14 15 16 17 18           19 20 21 22 23 24           25 26 27 28 29 30                Subjective:  \"I did a lot of sitting the other day, for about 4 hours and had no pain 0/10.\"      Objective: See treatment diary below      Assessment: Pt continues to tolerated treatment well. She has not been experiencing pain during sitting throughout the day thus flexion exercises will be gradually reintroduced. She experienced no pain the reintroduction of full sit ups, knees to chest, or box squatting. She did feel that slight bruise again with deep squatting but reports the pain was less than last session. She was educated on slowly re-introducing these exercises at home. Patient exhibited good technique with therapeutic exercises and would benefit from continued PT      Plan: Continue per plan of care.  Progress treatment as tolerated.    Goals  Short Term Goals:  In 2-4 weeks, the patient will:  1. Patient will report at least a 25% reduction in subjective pain complaints/symptoms to better manage ADLs and work duties. (MET)  2. Patient will be able to self-reduce their pain by adhering to HEP exercises to demonstrate independence in program and the efficacy of the HEP. (MET)  3. Patient will be able to perform sumo squats without any pain or discomfort to demonstrate progress in " symptom reduction.      Long Term Goals:  In 6-8 weeks, the patient will:  1. Pt will report no symptoms after two full days of work.  2. FOTO to greater than predicted value.  3. Pt will be able to sit for over 30 minutes without experiencing a re-creation of radiating back pain indicating her return to work duties and daily functions. (MET)  4. Pt will be able to perform ADLs, iADLS, and household duties with minimal restriction indicating return to PLOF.  5. Pt independent with rationale, technique and plan for performance of advanced HEP to ensure independent self-management of symptoms upon discharge.     Precautions: Standard precautions  Past Medical History:   Diagnosis Date    Headache     Ovarian cyst            Manuals 1/20 1/22 1/27 1/29         A-P mobs 8 min 8 min  NV                                                Neuro Re-Ed             Prone press ups  1x10 between each exercise 1x10 between each exercise 1x10 between each with clinician overpressure 1x10 between each rep         TB rows 3x10 3x10 1x 30 1x30         TB shoulder extensions 3x 10 3x10 1x 30 1x30         Paloff press 3x 10 2x 10 each side 8x 10 sec hold each side 8x 10 sec hold each side         Bridges 3x10 3x10 3 sec hold TrA cont 3x10 3 sec hold TrA cont 3x10 3 sec hold TrA cont         Bird dogs    2x 20 3 sec hold 2x 20 3 sec hold         Dead bugs   2x 20 3 sec hold 2x 20 3 sec hold                      Ther Ex             Hamstring stretch with strap 5x 20 sec each side 5x 20 sec each side 3x 20 sec each side          Kick backs with weight  2x 15 each side 4# CW           Marching in place 2 min             Adduction squeezes             S/L clamshells w Blue TB             Hooklying hip rotation    2 min          Clamshell 2.0 with IR    1x 15 each side BTB         KTC    2x6         Full sit ups    2x6         Ther Activity                                       Gait Training                                       Modalities

## 2025-02-03 ENCOUNTER — OFFICE VISIT (OUTPATIENT)
Dept: PHYSICAL THERAPY | Facility: MEDICAL CENTER | Age: 20
End: 2025-02-03
Payer: COMMERCIAL

## 2025-02-03 ENCOUNTER — OFFICE VISIT (OUTPATIENT)
Dept: OBGYN CLINIC | Facility: HOSPITAL | Age: 20
End: 2025-02-03
Payer: COMMERCIAL

## 2025-02-03 VITALS — HEIGHT: 66 IN | WEIGHT: 147.05 LBS | BODY MASS INDEX: 23.63 KG/M2

## 2025-02-03 DIAGNOSIS — M54.50 LUMBAR BACK PAIN: Primary | ICD-10-CM

## 2025-02-03 DIAGNOSIS — M54.16 LUMBAR RADICULOPATHY: ICD-10-CM

## 2025-02-03 PROCEDURE — 97140 MANUAL THERAPY 1/> REGIONS: CPT | Performed by: PHYSICAL THERAPIST

## 2025-02-03 PROCEDURE — 99213 OFFICE O/P EST LOW 20 MIN: CPT | Performed by: ORTHOPAEDIC SURGERY

## 2025-02-03 PROCEDURE — 97110 THERAPEUTIC EXERCISES: CPT | Performed by: PHYSICAL THERAPIST

## 2025-02-03 PROCEDURE — 97112 NEUROMUSCULAR REEDUCATION: CPT | Performed by: PHYSICAL THERAPIST

## 2025-02-03 NOTE — PROGRESS NOTES
"Daily Note   IE:   POC:  - 25  Re-eval:     Today's date: 2/3/2025  Patient name: Betsy Vivar  : 2005  MRN: [id number]  Referring provider: Uma Hatch  Dx:   No diagnosis found.                 Eval/Re-Eval POC Expires Auth #/ Referral # Total Visits Start Date Expiration Date Extension Info Visits Limitation       80                                                         1 2 3 4 5 6   1/14 1/20 1/22 1/27 1/29 2/3   7 8 9 10 11 12           13 14 15 16 17 18           19 20 21 22 23 24           25 26 27 28 29 30                Subjective:  \"Pain yesterday was a 6/10 when sitting at the table all day. 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 I felt it in the first 5 minutes of sitting.\"       Objective: See treatment diary below      Assessment: Pt continues to tolerated treatment well. Pt was unable to recreate seating pain today during session when sitting doing exercises for about 5 minutes. She continues to show good core and hip strength but was challenged by endurance exercises and holds in these muscle groups. VC were given as a reminder for TrA contraction. She continues to tolerate increasing flexion motions well. Still feels a bruise in deep flexion. Patient exhibited good technique with therapeutic exercises and would benefit from continued PT      Plan: Continue per plan of care.  Progress treatment as tolerated.    Goals  Short Term Goals:  In 2-4 weeks, the patient will:  1. Patient will report at least a 25% reduction in subjective pain complaints/symptoms to better manage ADLs and work duties. (MET)  2. Patient will be able to self-reduce their pain by adhering to HEP exercises to demonstrate independence in program and the efficacy of the HEP. (MET)  3. Patient will be able to perform sumo squats without any pain or discomfort to demonstrate progress in symptom reduction.      Long Term Goals:  In 6-8 weeks, the patient " will:  1. Pt will report no symptoms after two full days of work.  2. FOTO to greater than predicted value.  3. Pt will be able to sit for over 30 minutes without experiencing a re-creation of radiating back pain indicating her return to work duties and daily functions. (MET)  4. Pt will be able to perform ADLs, iADLS, and household duties with minimal restriction indicating return to PLOF.  5. Pt independent with rationale, technique and plan for performance of advanced HEP to ensure independent self-management of symptoms upon discharge.     Precautions: Standard precautions  Past Medical History:   Diagnosis Date    Headache     Ovarian cyst            Manuals 1/20 1/22 1/27 1/29 2/3        A-P mobs 8 min 8 min  NV 8 min                                               Neuro Re-Ed             Prone press ups  1x10 between each exercise 1x10 between each exercise 1x10 between each with clinician overpressure 1x10 between each rep 1x10 between each rep        TB rows 3x10 3x10 1x 30 1x30 1x30  seated to test for symptoms        TB shoulder extensions 3x 10 3x10 1x 30 1x30 1x30 seated to test for symptoms        Paloff press 3x 10 2x 10 each side 8x 10 sec hold each side 8x 10 sec hold each side 8x 10 sec hold each side        Bridges 3x10 3x10 3 sec hold TrA cont 3x10 3 sec hold TrA cont 3x10 3 sec hold TrA cont 3x10 3 sec hold TrA cont        Bird dogs    2x 20 3 sec hold 2x 20 3 sec hold 2x 20 3 sec hold        Dead bugs   2x 20 3 sec hold 2x 20 3 sec hold 1x10 5 sec hold        Hip Hike     1x20 each side        Ther Ex             Hamstring stretch with strap 5x 20 sec each side 5x 20 sec each side 3x 20 sec each side          Piriformis stretch     3x20 sec ea leg        Kick backs with weight  2x 15 each side 4# CW           Marching in place 2 min             Adduction squeezes             S/L clamshells w Blue TB             Hooklying hip rotation    2 min          Clamshell 2.0 with IR    1x 15 each side BTB  1x 15 each side BTB        KTC    2x6 2x10        Full sit ups    2x6 1x10        Ther Activity                                       Gait Training                                       Modalities

## 2025-02-03 NOTE — PROGRESS NOTES
Assessment & Plan/Medical Decision Makin y.o. female with Back Pain and imaging findings most notable for mild lumbar spondylosis        The clinical, physical and imaging findings were reviewed with the patient.  Betsy  has a constellation of findings consistent with low back and tailbone pain.  Fortunately patient remains neurologically intact and functional.     Physical exam showing no weakness, no significant TTP.   We discussed the treatment options including physical therapy, at home exercises, activity modifications, chiropractic medicine, oral medications, interventional spine procedures.  At this time recommend continued conservative treatments.    Reviewed MRI lumbar in detail with patient. No neural compression or stenosis noted on MRI. No spine surgical intervention warranted.   Can continue with physical therapy to work on core strengthening, lumbar ROM, strengthening, and stretching exercises if providing benefit.    Would also recommend patient follow up with her OB/GYN specialist for further evaluation.     Patient instructed to return to office/ER sooner if symptoms are not improving, getting worse, or new worrisome/neurologic symptoms arise.  Patient will follow up as needed.     Subjective:      Chief Complaint: Back Pain    HPI:  Betsy Vivar is a 19 y.o. female presenting for initial visit with chief complaint of back pain - referred by Gail Zepeda. Ongoing back and tailbone pain since May without injury. Pain is worse when going from bending to standing position and when doing exercises such as hip bridges. Also gets aggravated when lifting up objects. Pain is not constant but is debilitating when it occurs. Some pain with prolonged sitting. Pain will sometimes radiate into her abdomen. Denies radiation of pain into her lower extremities. Denies numbness or tingling. Denies lower extremity weakness. Denies any nitish trauma. Denies fever or chills, no night sweats. Denies any  bladder or bowel changes.      Conservative therapy includes the following:   Medications: motrin as needed    Injections: denies     Physical Therapy: denies  Chiropractic Medicine: has attempted  Accupunture/Massage Therapy: has not attempted   These therapeutic modalities were ineffective at providing sustained pain relief/functional improvement.     Nicotine dependent: denies  Occupation: [employer] and [employer]  Living situation: Lives with family   ADLs: patient is able to perform     Update HPI on 1/8/2024:  Betsy is here for follow up, pelvic MRI review. Continues with low back and tailbone pain. Pain is present and bothersome on a daily basis and will become severe at times. Denies gluteal region pain. Does report numbness/tingling sensation into medial aspect of left lower extremity to the level of her ankle. Symptoms occur with prolonged sitting and with certain activities and exercises like hip bridges. She does report possible family history of Shannon Danlos Syndrome. No issues with standing or walking. She has been undergoing cranial-sacral therapy with some temporary relief. Has also undergone muscle-touch kinesiology.    Update HPI on 2/3/2025:  Betsy is here for follow up, lumbar MRI review. Symptoms remain unchanged since previous visit.  Has been going to PT with some benefit but pain remains.     Objective:     Family History   Problem Relation Age of Onset    Anxiety disorder Mother     Endometriosis Mother     Migraines Mother     Seizures Mother     Chiari malformation Mother     No Known Problems Father     Anxiety disorder Sister     Irritable bowel syndrome Sister     Diabetes Maternal Grandmother     Hypertension Maternal Grandfather     Diabetes Paternal Grandmother     Mental illness Neg Hx     Addiction problem Neg Hx        Past Medical History:   Diagnosis Date    Headache     Ovarian cyst        Current Outpatient Medications   Medication Sig Dispense Refill    ALPRAZolam (XANAX)  0.25 mg tablet Take 1 tablet (0.25 mg total) by mouth if needed for anxiety Take 1-2 tablets 1 hour prior to MRI. Do not drive or operate machinery. Please arrange for ride to and from MRI. Do not take any other sedative medications or alcohol while taking Xanax. 2 tablet 0    cyclobenzaprine (FLEXERIL) 10 mg tablet Take 1 tablet (10 mg total) by mouth 2 (two) times a day as needed for muscle spasms May make you drowsy or dizzy. Do not drive or operate machinery until you know how this medication affects you, as it may cause lethargy and mental cloudiness. Drive with caution. May cause blurred vision. Avoid alcohol/other drugs that make you sleepy 30 tablet 0    Multiple Vitamin (multivitamin) capsule Take 1 capsule by mouth daily      Sodium Fluoride 5000 PPM 1.1 % PSTE USE LIKE TOOTHPASTE. DO NOT RINSE, DRINK OR EAT FOR 30 MINUTES AFTER USE.       No current facility-administered medications for this visit.       Past Surgical History:   Procedure Laterality Date    FL INJECTION LEFT SHOULDER (ARTHROGRAM)  12/27/2023    [facility] SURGICAL ARTHROSCOPY SHOULDER REPAIR SLAP LESION Left 2/2/2024    Procedure: ARTHROSCOPY SHOULDER- left Diagnostic;  Surgeon: Sylvia Clayton DO;  Location:  MAIN OR;  Service: Orthopedics    WISDOM TOOTH EXTRACTION Bilateral     bottom wisdom teeth removed       Social History     Socioeconomic History    Marital status: Single     Spouse name: Not on file    Number of children: Not on file    Years of education: Not on file    Highest education level: Not on file   Occupational History    Not on file   Tobacco Use    Smoking status: Never     Passive exposure: Never    Smokeless tobacco: Never   Vaping Use    Vaping status: Never Used   Substance and Sexual Activity    Alcohol use: Not Currently    Drug use: Never    Sexual activity: Never     Partners: Male   Other Topics Concern    Not on file   Social History Narrative    Not on file     Social Drivers of Health     Financial Resource  "Strain: Not on file   Food Insecurity: Not on file   Transportation Needs: Not on file   Physical Activity: Not on file   Stress: Not on file   Social Connections: Not on file   Intimate Partner Violence: Not on file   Housing Stability: Not on file       Allergies   Allergen Reactions    Procaine Rash       Review of Systems  General- denies fever/chills  HEENT- denies hearing loss or sore throat  Eyes- denies eye pain or visual disturbances, denies red eyes  Respiratory- denies cough or SOB  Cardio- denies chest pain or palpitations  GI- denies abdominal pain  Endocrine- denies urinary frequency  Urinary- denies pain with urination  Musculoskeletal- Negative except noted above  Skin- denies rashes or wounds  Neurological- denies dizziness or headache  Psychiatric- denies anxiety or difficulty concentrating    Physical Exam  Ht 5' 6\" (1.676 m)   Wt 66.7 kg (147 lb 0.8 oz)   BMI 23.73 kg/m²     General/Constitutional: No apparent distress: well-nourished and well developed.  Lymphatic: No appreciable lymphadenopathy  Respiratory: Non-labored breathing  Vascular: No edema, swelling or tenderness, except as noted in detailed exam.  Integumentary: No impressive skin lesions present, except as noted in detailed exam.  Psych: Normal mood and affect, oriented to person, place and time.  MSK: normal other than stated in HPI and exam  Gait & balance: no evidence of myelopathic gait, ambulates Independently     Lumbar spine range of motion:  -Forward flexion to 90  -Extension to neutral  -Lateral bend 25 right, 25 left  -Rotation 25 right, 25 left  There tenderness with palpation along lumbar paraspinal musculature, no midline tenderness     Neurologic:  Lower Extremity Motor Function    Right  Left    Iliopsoas  5/5  5/5    Quadriceps 5/5 5/5   Tibialis anterior  5/5  5/5    EHL  5/5  5/5    Gastroc. muscle  5/5  5/5    Heel rise  5/5  5/5    Toe rise  5/5  5/5      Sensory: light touch is intact to bilateral upper and " "lower extremities     Reflexes: intact     Other tests:  Straight Leg Raise: negative  Akira SI: negative  WERO SI: negative  Greater troch: no tenderness   Internal/external hip ROM: intact, no pain   Flexion/extension knee ROM: intact, no pain   Vascular: WWP extremities, 2+DP bilateral      Diagnostic Tests   IMAGING: I have personally reviewed the images and these are my findings:  Lumbar Spine MRI from 2/3/2025: no significant lumbar spondylosis, disc space heights well maintained, small dis protrusions at L4-5 and L5-S1 without stenosis, no significant neural compression noted, no appreciated lytic/blastic lesions, no obvious instability    Lumbar Spine X-rays from 11/6/2024: mild lumbar spondylosis, disc height well maintained, no apparent spondylolisthesis, no appreciated lytic/blastic lesions, no obvious instability    Pelvic MRI from 11/20/2024: no acute fracture or osseous abnormality, visualized lumbar spine without significant degenerative changes    Electronic Medical Records were reviewed including office notes, imaging studies    Procedures, if performed today     None performed       Portions of the record may have been created with voice recognition software.  Occasional wrong word or \"sound a like\" substitutions may have occurred due to the inherent limitations of voice recognition software.  Read the chart carefully and recognize, using context, where substitutions have occurred.  "

## 2025-02-07 ENCOUNTER — OFFICE VISIT (OUTPATIENT)
Dept: FAMILY MEDICINE CLINIC | Facility: CLINIC | Age: 20
End: 2025-02-07
Payer: COMMERCIAL

## 2025-02-07 VITALS
DIASTOLIC BLOOD PRESSURE: 70 MMHG | TEMPERATURE: 96.3 F | BODY MASS INDEX: 22.82 KG/M2 | HEIGHT: 66 IN | SYSTOLIC BLOOD PRESSURE: 120 MMHG | WEIGHT: 142 LBS | HEART RATE: 93 BPM | RESPIRATION RATE: 17 BRPM | OXYGEN SATURATION: 99 %

## 2025-02-07 DIAGNOSIS — Z00.00 ANNUAL PHYSICAL EXAM: Primary | ICD-10-CM

## 2025-02-07 PROCEDURE — 99395 PREV VISIT EST AGE 18-39: CPT | Performed by: NURSE PRACTITIONER

## 2025-02-07 NOTE — PROGRESS NOTES
Adult Annual Physical  Name: Betsy Vivar      : 2005      MRN: [id number]  Encounter Provider: WENDI Whitlock  Encounter Date: 2025   Encounter department: [facility]    Assessment & Plan  Annual physical exam           Immunizations and preventive care screenings were discussed with patient today. Appropriate education was printed on patient's after visit summary.    Counseling:  Alcohol/drug use: discussed moderation in alcohol intake, the recommendations for healthy alcohol use, and avoidance of illicit drug use.  Dental Health: discussed importance of regular tooth brushing, flossing, and dental visits.  Injury prevention: discussed safety/seat belts, safety helmets, smoke detectors, carbon monoxide detectors, and smoking near bedding or upholstery.  Sexual health: discussed sexually transmitted diseases, partner selection, use of condoms, avoidance of unintended pregnancy, and contraceptive alternatives.  Exercise: the importance of regular exercise/physical activity was discussed. Recommend exercise 3-5 times per week for at least 30 minutes.          History of Present Illness     Adult Annual Physical:  Patient presents for annual physical. Here for wellness exam  .     Diet and Physical Activity:  - Diet/Nutrition: well balanced diet.  - Exercise: 3-4 times a week on average.    General Health:  - Sleep: sleeps well.  - Hearing: normal hearing right ear and normal hearing left ear.  - Vision: no vision problems.  - Dental: regular dental visits and brushes teeth once daily.    /GYN Health:  - Follows with GYN: yes.   - Menopause: premenopausal.   - History of STDs: no    Advanced Care Planning:  - Has an advanced directive?: no    - Has a durable medical POA?: no    - ACP document given to patient?: no      Review of Systems   Constitutional:  Negative for fatigue and fever.   HENT:  Negative for congestion, postnasal drip and rhinorrhea.    Eyes:  Negative for  photophobia and visual disturbance.   Respiratory:  Negative for cough, shortness of breath and wheezing.    Cardiovascular:  Negative for chest pain and palpitations.   Gastrointestinal:  Negative for constipation, diarrhea, nausea and vomiting.   Genitourinary:  Negative for dysuria and frequency.   Musculoskeletal:  Negative for arthralgias and myalgias.   Skin:  Negative for rash.   Neurological:  Negative for dizziness, light-headedness and headaches.   Hematological:  Negative for adenopathy.   Psychiatric/Behavioral:  Negative for dysphoric mood and suicidal ideas. The patient is not nervous/anxious.      Pertinent Medical History           Medical History Reviewed by provider this encounter:  Tobacco  Allergies  Meds  Problems  Med Hx  Surg Hx  Fam Hx     .  Past Medical History   Past Medical History:   Diagnosis Date    Headache     Ovarian cyst      Past Surgical History:   Procedure Laterality Date    FL INJECTION LEFT SHOULDER (ARTHROGRAM)  12/27/2023    SC SURGICAL ARTHROSCOPY SHOULDER REPAIR SLAP LESION Left 2/2/2024    Procedure: ARTHROSCOPY SHOULDER- left Diagnostic;  Surgeon: Sylvia Clayton DO;  Location:  MAIN OR;  Service: Orthopedics    WISDOM TOOTH EXTRACTION Bilateral     bottom wisdom teeth removed     Family History   Problem Relation Age of Onset    Anxiety disorder Mother     Endometriosis Mother     Migraines Mother     Seizures Mother     Chiari malformation Mother     No Known Problems Father     Anxiety disorder Sister     Irritable bowel syndrome Sister     Diabetes Maternal Grandmother     Hypertension Maternal Grandfather     Diabetes Paternal Grandmother     Mental illness Neg Hx     Addiction problem Neg Hx       reports that she has never smoked. She has never been exposed to tobacco smoke. She has never used smokeless tobacco. She reports that she does not currently use alcohol. She reports that she does not use drugs.  Current Outpatient Medications   Medication  Instructions    ALPRAZolam (XANAX) 0.25 mg, Oral, As needed, Take 1-2 tablets 1 hour prior to MRI. Do not drive or operate machinery. Please arrange for ride to and from MRI. Do not take any other sedative medications or alcohol while taking Xanax.    cyclobenzaprine (FLEXERIL) 10 mg, Oral, 2 times daily PRN, May make you drowsy or dizzy. Do not drive or operate machinery until you know how this medication affects you, as it may cause lethargy and mental cloudiness. Drive with caution. May cause blurred vision. Avoid alcohol/other drugs that make you sleepy    Multiple Vitamin (multivitamin) capsule 1 capsule, Daily    Sodium Fluoride 5000 PPM 1.1 % PSTE USE LIKE TOOTHPASTE. DO NOT RINSE, DRINK OR EAT FOR 30 MINUTES AFTER USE.     Allergies   Allergen Reactions    Procaine Rash      Current Outpatient Medications on File Prior to Visit   Medication Sig Dispense Refill    Multiple Vitamin (multivitamin) capsule Take 1 capsule by mouth daily      Sodium Fluoride 5000 PPM 1.1 % PSTE USE LIKE TOOTHPASTE. DO NOT RINSE, DRINK OR EAT FOR 30 MINUTES AFTER USE.      ALPRAZolam (XANAX) 0.25 mg tablet Take 1 tablet (0.25 mg total) by mouth if needed for anxiety Take 1-2 tablets 1 hour prior to MRI. Do not drive or operate machinery. Please arrange for ride to and from MRI. Do not take any other sedative medications or alcohol while taking Xanax. (Patient not taking: Reported on 2/7/2025) 2 tablet 0    cyclobenzaprine (FLEXERIL) 10 mg tablet Take 1 tablet (10 mg total) by mouth 2 (two) times a day as needed for muscle spasms May make you drowsy or dizzy. Do not drive or operate machinery until you know how this medication affects you, as it may cause lethargy and mental cloudiness. Drive with caution. May cause blurred vision. Avoid alcohol/other drugs that make you sleepy (Patient not taking: Reported on 2/7/2025) 30 tablet 0     No current facility-administered medications on file prior to visit.      Social History  "    Tobacco Use    Smoking status: Never     Passive exposure: Never    Smokeless tobacco: Never   Vaping Use    Vaping status: Never Used   Substance and Sexual Activity    Alcohol use: Not Currently    Drug use: Never    Sexual activity: Never     Partners: Male       Objective   /70 (BP Location: Left arm, Patient Position: Sitting, Cuff Size: Standard)   Pulse 93   Temp (!) 96.3 °F (35.7 °C) (Tympanic)   Resp 17   Ht 5' 6.3\" (1.684 m)   Wt 64.4 kg (142 lb)   SpO2 99%   BMI 22.71 kg/m²     Physical Exam  Vitals and nursing note reviewed.   Constitutional:       Appearance: Normal appearance.   HENT:      Head: Normocephalic and atraumatic.      Right Ear: Tympanic membrane, ear canal and external ear normal.      Left Ear: Tympanic membrane, ear canal and external ear normal.      Nose: Nose normal.      Mouth/Throat:      Mouth: Mucous membranes are moist.   Eyes:      Conjunctiva/sclera: Conjunctivae normal.   Cardiovascular:      Rate and Rhythm: Normal rate and regular rhythm.      Heart sounds: Normal heart sounds.   Pulmonary:      Effort: Pulmonary effort is normal.      Breath sounds: Normal breath sounds.   Abdominal:      General: Bowel sounds are normal.      Palpations: Abdomen is soft.   Musculoskeletal:         General: Normal range of motion.      Cervical back: Normal range of motion and neck supple.   Skin:     General: Skin is warm and dry.      Capillary Refill: Capillary refill takes less than 2 seconds.   Neurological:      General: No focal deficit present.      Mental Status: She is alert and oriented to person, place, and time.   Psychiatric:         Mood and Affect: Mood normal.         Behavior: Behavior normal.         "

## 2025-02-07 NOTE — PATIENT INSTRUCTIONS
"Patient Education     Routine physical for adults   The Basics   Written by the doctors and editors at [facility]   What is a physical? -- A physical is a routine visit, or \"check-up,\" with your doctor. You might also hear it called a \"wellness visit\" or \"preventive visit.\"  During each visit, the doctor will:   Ask about your physical and mental health   Ask about your habits, behaviors, and lifestyle   Do an exam   Give you vaccines if needed   Talk to you about any medicines you take   Give advice about your health   Answer your questions  Getting regular check-ups is an important part of taking care of your health. It can help your doctor find and treat any problems you have. But it's also important for preventing health problems.  A routine physical is different from a \"sick visit.\" A sick visit is when you see a doctor because of a health concern or problem. Since physicals are scheduled ahead of time, you can think about what you want to ask the doctor.  How often should I get a physical? -- It depends on your age and health. In general, for people age 21 years and older:   If you are younger than 50 years, you might be able to get a physical every 3 years.   If you are 50 years or older, your doctor might recommend a physical every year.  If you have an ongoing health condition, like diabetes or high blood pressure, your doctor will probably want to see you more often.  What happens during a physical? -- In general, each visit will include:   Physical exam - The doctor or nurse will check your height, weight, heart rate, and blood pressure. They will also look at your eyes and ears. They will ask about how you are feeling and whether you have any symptoms that bother you.   Medicines - It's a good idea to bring a list of all the medicines you take to each doctor visit. Your doctor will talk to you about your medicines and answer any questions. Tell them if you are having any side effects that bother you. You " "should also tell them if you are having trouble paying for any of your medicines.   Habits and behaviors - This includes:   Your diet   Your exercise habits   Whether you smoke, drink alcohol, or use drugs   Whether you are sexually active   Whether you feel safe at home  Your doctor will talk to you about things you can do to improve your health and lower your risk of health problems. They will also offer help and support. For example, if you want to quit smoking, they can give you advice and might prescribe medicines. If you want to improve your diet or get more physical activity, they can help you with this, too.   Lab tests, if needed - The tests you get will depend on your age and situation. For example, your doctor might want to check your:   Cholesterol   Blood sugar   Iron level   Vaccines - The recommended vaccines will depend on your age, health, and what vaccines you already had. Vaccines are very important because they can prevent certain serious or deadly infections.   Discussion of screening - \"Screening\" means checking for diseases or other health problems before they cause symptoms. Your doctor can recommend screening based on your age, risk, and preferences. This might include tests to check for:   Cancer, such as breast, prostate, cervical, ovarian, colorectal, prostate, lung, or skin cancer   Sexually transmitted infections, such as chlamydia and gonorrhea   Mental health conditions like depression and anxiety  Your doctor will talk to you about the different types of screening tests. They can help you decide which screenings to have. They can also explain what the results might mean.   Answering questions - The physical is a good time to ask the doctor or nurse questions about your health. If needed, they can refer you to other doctors or specialists, too.  Adults older than 65 years often need other care, too. As you get older, your doctor will talk to you about:   How to prevent falling at " home   Hearing or vision tests   Memory testing   How to take your medicines safely   Making sure that you have the help and support you need at home  All topics are updated as new evidence becomes available and our peer review process is complete.  This topic retrieved from [employer] on: May 02, 2024.  Topic 264618 Version 1.0  Release: 32.4.3 - C32.122  © 2024 UpToDate, Inc. and/or its affiliates. All rights reserved.  Consumer Information Use and Disclaimer   Disclaimer: This generalized information is a limited summary of diagnosis, treatment, and/or medication information. It is not meant to be comprehensive and should be used as a tool to help the user understand and/or assess potential diagnostic and treatment options. It does NOT include all information about conditions, treatments, medications, side effects, or risks that may apply to a specific patient. It is not intended to be medical advice or a substitute for the medical advice, diagnosis, or treatment of a health care provider based on the health care provider's examination and assessment of a patient's specific and unique circumstances. Patients must speak with a health care provider for complete information about their health, medical questions, and treatment options, including any risks or benefits regarding use of medications. This information does not endorse any treatments or medications as safe, effective, or approved for treating a specific patient. UpToDate, Inc. and its affiliates disclaim any warranty or liability relating to this information or the use thereof.The use of this information is governed by the Terms of Use, available at https://www.woltersM360LOHAS outdoorsuwer.com/en/know/clinical-effectiveness-terms. 2024© UpToDate, Inc. and its affiliates and/or licensors. All rights reserved.  Copyright   © 2024 Up[employer]Date, Inc. and/or its affiliates. All rights reserved.

## 2025-02-10 ENCOUNTER — OFFICE VISIT (OUTPATIENT)
Dept: PHYSICAL THERAPY | Facility: MEDICAL CENTER | Age: 20
End: 2025-02-10
Payer: COMMERCIAL

## 2025-02-10 DIAGNOSIS — M54.50 LUMBAR BACK PAIN: Primary | ICD-10-CM

## 2025-02-10 DIAGNOSIS — M54.16 LUMBAR RADICULOPATHY: ICD-10-CM

## 2025-02-10 PROCEDURE — 97110 THERAPEUTIC EXERCISES: CPT | Performed by: PHYSICAL THERAPIST

## 2025-02-10 PROCEDURE — 97112 NEUROMUSCULAR REEDUCATION: CPT | Performed by: PHYSICAL THERAPIST

## 2025-02-10 NOTE — PROGRESS NOTES
"Daily Note/ Re-eval  IE:   POC:  - 25      Today's date: 2/10/2025  Patient name: Betsy Vivar  : 2005  MRN: [id number]  Referring provider: Uma Hatch  Dx:   Encounter Diagnosis     ICD-10-CM    1. Lumbar back pain  M54.50       2. Lumbar radiculopathy  M54.16               Start Time: 1730  Stop Time:   Total time in clinic (min): 45 minutes  Eval/Re-Eval POC Expires Auth #/ Referral # Total Visits Start Date Expiration Date Extension Info Visits Limitation       80                                                         1 2 3 4 5 6   1/14 1/20 1/22 1/27 1/29 2/3   7 8 9 10 11 12   2/10        13 14 15 16 17 18           19 20 21 22 23 24           25 26 27 28 29 30                Subjective:  \"All of last week my back was fine, a few rough days in there. Better from when we started.\"      Objective: See treatment diary below  Pt Lumbar motion is still WNL but less pain at end range flexion.   Pain (bruise) at end range flexion is abolished by TrA contraction  Pain is classified as moderate irritability but less than before  Pain at its worst 6/10    Assessment: Pt continues to tolerated treatment well. Her motion into extension has increased from the initial session and her pain no longer radiates down her leg. She feels the pain when deep squatting or in prolonged sitting. When feng her TrA, she noticed a decrease in pain while deep squatting and this remains an emphasis in symptom provoking positions. Her symptoms are less irritating then they used to be and sitting remains the one issue that is inconsistently an issue. A focus on deep core contraction and continues lumbo-sacral movement are the focus. Patient exhibited good technique with therapeutic exercises and would benefit from continued PT      Plan: Continue per plan of care.  Progress treatment as tolerated.    Goals  Short Term Goals:  In 2-4 weeks, the patient will:  1. Patient will report " at least a 25% reduction in subjective pain complaints/symptoms to better manage ADLs and work duties. (MET)  2. Patient will be able to self-reduce their pain by adhering to HEP exercises to demonstrate independence in program and the efficacy of the HEP. (MET)  3. Patient will be able to perform sumo squats without any pain or discomfort to demonstrate progress in symptom reduction.      Long Term Goals:  In 6-8 weeks, the patient will:  1. Pt will report no symptoms after two full days of work. (MET)  2. FOTO to greater than predicted value.  3. Pt will be able to sit for over 30 minutes without experiencing a re-creation of radiating back pain indicating her return to work duties and daily functions. (MET)  4. Pt will be able to perform ADLs, iADLS, and household duties with minimal restriction indicating return to PLOF.  5. Pt independent with rationale, technique and plan for performance of advanced HEP to ensure independent self-management of symptoms upon discharge.     Precautions: Standard precautions  Past Medical History:   Diagnosis Date    Headache     Ovarian cyst            Manuals 1/20 1/22 1/27 1/29 2/3 2/10       A-P mobs 8 min 8 min  NV 8 min CB       Hip traction mob                                       Neuro Re-Ed             Prone press ups  1x10 between each exercise 1x10 between each exercise 1x10 between each with clinician overpressure 1x10 between each rep 1x10 between each rep 1x10 between each rep       TB rows 3x10 3x10 1x 30 1x30 1x30  seated to test for symptoms        TB shoulder extensions 3x 10 3x10 1x 30 1x30 1x30 seated to test for symptoms        Paloff press 3x 10 2x 10 each side 8x 10 sec hold each side 8x 10 sec hold each side 8x 10 sec hold each side 10x 10 sec hold ea side       Bridges 3x10 3x10 3 sec hold TrA cont 3x10 3 sec hold TrA cont 3x10 3 sec hold TrA cont 3x10 3 sec hold TrA cont 5x with 10 marches before loweringTrA cont. With arching       Bird dogs    2x 20 3  sec hold 2x 20 3 sec hold 2x 20 3 sec hold 2x 20 3 sec hold       Dead bugs   2x 20 3 sec hold 2x 20 3 sec hold 1x10 5 sec hold 2x 20 3 sec hold no ball       Hip Hike     1x20 each side                     Ther Ex             Hamstring stretch with strap 5x 20 sec each side 5x 20 sec each side 3x 20 sec each side          Piriformis stretch     3x20 sec ea leg 3x 20 sec each side       Kick backs with weight  2x 15 each side 4# CW           Marching in place 2 min             Adduction squeezes             S/L clamshells w Blue TB             Hooklying hip rotation    2 min          Clamshell 2.0 with IR    1x 15 each side BTB 1x 15 each side BTB NV       KTC    2x6 2x10        Full sit ups    2x6 1x10        Goblet squats      2 x 10       Ther Activity                   Re-eval                    Gait Training                                       Modalities

## 2025-02-19 ENCOUNTER — APPOINTMENT (OUTPATIENT)
Dept: PHYSICAL THERAPY | Facility: MEDICAL CENTER | Age: 20
End: 2025-02-19
Payer: COMMERCIAL

## 2025-02-20 ENCOUNTER — APPOINTMENT (OUTPATIENT)
Dept: PHYSICAL THERAPY | Facility: MEDICAL CENTER | Age: 20
End: 2025-02-20
Payer: COMMERCIAL

## 2025-02-24 ENCOUNTER — RESULTS FOLLOW-UP (OUTPATIENT)
Dept: FAMILY MEDICINE CLINIC | Facility: CLINIC | Age: 20
End: 2025-02-24

## 2025-02-26 ENCOUNTER — OFFICE VISIT (OUTPATIENT)
Dept: PHYSICAL THERAPY | Facility: MEDICAL CENTER | Age: 20
End: 2025-02-26
Payer: COMMERCIAL

## 2025-02-26 DIAGNOSIS — M54.50 LUMBAR BACK PAIN: Primary | ICD-10-CM

## 2025-02-26 DIAGNOSIS — M54.16 LUMBAR RADICULOPATHY: ICD-10-CM

## 2025-02-26 PROCEDURE — 97112 NEUROMUSCULAR REEDUCATION: CPT | Performed by: PHYSICAL THERAPIST

## 2025-02-26 PROCEDURE — 97140 MANUAL THERAPY 1/> REGIONS: CPT | Performed by: PHYSICAL THERAPIST

## 2025-02-26 NOTE — PROGRESS NOTES
"Daily Note  IE:   POC:  - 25      Today's date: 2025  Patient name: Betsy Vivar  : 2005  MRN: [id number]  Referring provider: Uma Hatch  Dx:   Encounter Diagnosis     ICD-10-CM    1. Lumbar back pain  M54.50       2. Lumbar radiculopathy  M54.16                 Start Time: 0800  Stop Time: 0845  Total time in clinic (min): 45 minutes  Eval/Re-Eval POC Expires Auth #/ Referral # Total Visits Start Date Expiration Date Extension Info Visits Limitation       80                                                         1 2 3 4 5 6   1/14 1/20 1/22 1/27 1/29 2/3   7 8 9 10 11 12   2/10 2/26       13 14 15 16 17 18           19 20 21 22 23 24           25 26 27 28 29 30                Subjective:  \"It is still only sitting that sometimes it bothers me but sometimes its fine. Standing to sitting bothered it the other day and I felt that the next day as well.\"      Objective: See treatment diary below  Pt Lumbar motion is still WNL but less pain at end range flexion.   Pain (bruise) at end range flexion is abolished by TrA contraction  Pain is classified as moderate irritability but less than before  Pain at its worst 6/10    Assessment: Pt continues to tolerated treatment well. She found relief and had no pain while sitting or squatting after performing lumbar extensions followed by marching bridges. Pelvic tilts were taught to provide postural awareness while sitting or performing exercise. She demonstrated proper form in all exercises and has progressed well in limiting her back pain to only a mild bruise when sitting for extended periods of time. Pt was educated on the importance of a strong core and lumbo-sacral gentle movement to relieve her pain as she presents with a hypermobile lumbar spine. Pt will be discharged following the session to transition to a home exercise program as she is able to self manage her symptoms. New HEP was given and reviewed with " patient.    Plan: Continue per plan of care.  Progress treatment as tolerated.    Goals  Short Term Goals:  In 2-4 weeks, the patient will:  1. Patient will report at least a 25% reduction in subjective pain complaints/symptoms to better manage ADLs and work duties. (MET)  2. Patient will be able to self-reduce their pain by adhering to HEP exercises to demonstrate independence in program and the efficacy of the HEP. (MET)  3. Patient will be able to perform sumo squats without any pain or discomfort to demonstrate progress in symptom reduction.      Long Term Goals:  In 6-8 weeks, the patient will:  1. Pt will report no symptoms after two full days of work. (MET)  2. FOTO to greater than predicted value. (Partially MET)  3. Pt will be able to sit for over 30 minutes without experiencing a re-creation of radiating back pain indicating her return to work duties and daily functions. (MET)  4. Pt will be able to perform ADLs, iADLS, and household duties with minimal restriction indicating return to PLOF. (MET)  5. Pt independent with rationale, technique and plan for performance of advanced HEP to ensure independent self-management of symptoms upon discharge. (MET)     Precautions: Standard precautions  Past Medical History:   Diagnosis Date    Headache     Ovarian cyst      HEP for discharge 2/26:  Access Code: [email or web address]  URL: [email or web address]  Date: 02/26/2025  Prepared by: Gagan Young    Exercises  - Prone Press Up  - 5 x daily - 7 x weekly - 3 sets - 10 reps  - Quadruped Rock Back into Prone Press Up  - 2 x daily - 7 x weekly - 3 sets - 10 reps  - Supine Lower Trunk Rotation  - 2 x daily - 7 x weekly - 3 sets - 10 reps  - Standing Lumbar Extension with Counter  - 5 x daily - 7 x weekly - 3 sets - 10 reps  - Clamshell with Resistance  - 1 x daily - 7 x weekly - 3 sets - 10 reps  - Bird Dog  - 2 x daily - 7 x weekly - 3 sets - 10 reps  - Supine Bridge  - 2 x daily - 7 x weekly - 3 sets - 10  reps  - Seated Pelvic Tilt  - 2 x daily - 7 x weekly - 3 sets - 10 reps  - Pelvic Tilt on Swiss Ball  - 2 x daily - 7 x weekly - 3 sets - 10 reps  - Dead Bug with Swiss Ball  - 2 x daily - 7 x weekly - 3 sets - 10 reps  - Standing Anti-Rotation Press with Anchored Resistance  - 2 x daily - 7 x weekly - 3 sets - 10 reps  - Standing Hip Hiking  - 2 x daily - 7 x weekly - 3 sets - 10 reps  - Seated Transversus Abdominis Bracing  - 10 x daily - 7 x weekly - 10 reps - 30 hold      Manuals 1/20 1/22 1/27 1/29 2/3 2/10 2/26      A-P mobs 8 min 8 min  NV 8 min CB CB      Hip traction mob                                       Neuro Re-Ed             Prone press ups  1x10 between each exercise 1x10 between each exercise 1x10 between each with clinician overpressure 1x10 between each rep 1x10 between each rep 1x10 between each rep       Standing extensions       1x 10 between each exercise      TB rows 3x10 3x10 1x 30 1x30 1x30  seated to test for symptoms        TB shoulder extensions 3x 10 3x10 1x 30 1x30 1x30 seated to test for symptoms        Paloff press 3x 10 2x 10 each side 8x 10 sec hold each side 8x 10 sec hold each side 8x 10 sec hold each side 10x 10 sec hold ea side 10x 10 sec hold ea side BLK band      Bridges 3x10 3x10 3 sec hold TrA cont 3x10 3 sec hold TrA cont 3x10 3 sec hold TrA cont 3x10 3 sec hold TrA cont 5x with 10 marches before loweringTrA cont. With arching 10x with SAQ at end TrA contraction    10x with marching 10x      Bird dogs    2x 20 3 sec hold 2x 20 3 sec hold 2x 20 3 sec hold 2x 20 3 sec hold 2x 20 3 sec hold      Dead bugs   2x 20 3 sec hold 2x 20 3 sec hold 1x10 5 sec hold 2x 20 3 sec hold no ball 30x with exercise ball, greater squeeze      Hip Hike     1x20 each side                     Ther Ex             Hamstring stretch with strap 5x 20 sec each side 5x 20 sec each side 3x 20 sec each side          Piriformis stretch     3x20 sec ea leg 3x 20 sec each side       Kick backs with weight   2x 15 each side 4# CW           Marching in place 2 min             Adduction squeezes             S/L clamshells w Blue TB             Hooklying hip rotation    2 min          Clamshell 2.0 with IR    1x 15 each side BTB 1x 15 each side BTB NV       KTC    2x6 2x10        Full sit ups    2x6 1x10        Goblet squats      2 x 10       Ther Activity                   Re-eval                    Gait Training                                       Modalities

## 2025-03-03 ENCOUNTER — OFFICE VISIT (OUTPATIENT)
Dept: OBGYN CLINIC | Facility: CLINIC | Age: 20
End: 2025-03-03
Payer: COMMERCIAL

## 2025-03-03 VITALS
BODY MASS INDEX: 23.46 KG/M2 | WEIGHT: 146 LBS | HEIGHT: 66 IN | DIASTOLIC BLOOD PRESSURE: 76 MMHG | SYSTOLIC BLOOD PRESSURE: 114 MMHG

## 2025-03-03 DIAGNOSIS — G89.29 CHRONIC MIDLINE LOW BACK PAIN WITHOUT SCIATICA: Primary | ICD-10-CM

## 2025-03-03 DIAGNOSIS — M54.50 CHRONIC MIDLINE LOW BACK PAIN WITHOUT SCIATICA: Primary | ICD-10-CM

## 2025-03-03 DIAGNOSIS — S39.92XD INJURY OF COCCYX, SUBSEQUENT ENCOUNTER: ICD-10-CM

## 2025-03-03 PROBLEM — S39.92XA INJURY OF COCCYX: Status: RESOLVED | Noted: 2025-03-03 | Resolved: 2025-03-03

## 2025-03-03 PROBLEM — S39.92XA INJURY OF COCCYX: Status: ACTIVE | Noted: 2025-03-03

## 2025-03-03 PROBLEM — N83.209 OVARIAN CYST: Status: RESOLVED | Noted: 2022-11-14 | Resolved: 2025-03-03

## 2025-03-03 PROCEDURE — 99203 OFFICE O/P NEW LOW 30 MIN: CPT | Performed by: OBSTETRICS & GYNECOLOGY

## 2025-03-03 RX ORDER — METHYLPREDNISOLONE 4 MG/1
TABLET ORAL
Qty: 1 EACH | Refills: 0 | Status: SHIPPED | OUTPATIENT
Start: 2025-03-03

## 2025-03-03 NOTE — ASSESSMENT & PLAN NOTE
Orders:    methylPREDNISolone 4 MG tablet therapy pack; Use as directed on package  - unlikely the pain is GYN in origin.  - offered pelvic exam, but declined and I explained that I would not expect to be able to isolate that pain she is feeling from a pelvic exam  - offered short course of steroids - poss inflammation causing pain  - recommend return for first pap at age 20  - offered Gardisil vaccine - declined

## 2025-03-03 NOTE — PROGRESS NOTES
Name: Betsy Vivar      : 2005      MRN: [id number]  Encounter Provider: Marcia Gruber MD  Encounter Date: 3/3/2025   Encounter department: [facility] TRA[facility]  :  Assessment & Plan  Injury of coccyx, subsequent encounter    Orders:    methylPREDNISolone 4 MG tablet therapy pack; Use as directed on package  - unlikely the pain is GYN in origin.  - offered pelvic exam, but declined and I explained that I would not expect to be able to isolate that pain she is feeling from a pelvic exam  - offered short course of steroids - poss inflammation causing pain  - recommend return for first pap at age 20  - offered Gardisil vaccine - declined  Chronic midline low back pain without sciatica             History of Present Illness   HPI  Betsy Vivar is a 19 y.o. female who presents with the complaint of tailbone pain.  This has been present for about 9 months.  There was no provoking injury.  She has been seeing a spine doctor and had several different imaging studies with no obvious source of pain.  She has been going to PT and chiro without improvement.  She did have significant short term improvement after having accupuncture.  The pain is intermittent, as high as 7/10.  Worse with prolonged sitting or physical exercise.  Tylenol, motrin and a muscle relaxer have not helped.  She was recommended to see GYN as her current treatments have not been successful    She has a regular monthly period that is not painful.  She denies vaginal pain or discharge.  She is not sexually active.  No pain with voiding or bowel movements.  She had an ovarian cyst in the past - this pain is different    History obtained from: patient    Review of Systems   Constitutional: Negative.    HENT: Negative.     Respiratory: Negative.     Cardiovascular: Negative.    Gastrointestinal: Negative.    Genitourinary: Negative.    Musculoskeletal:  Positive for back pain.   Neurological: Negative.   "  Psychiatric/Behavioral: Negative.       Past Medical History   Past Medical History:   Diagnosis Date    Headache     Ovarian cyst      Past Surgical History:   Procedure Laterality Date    [place] INJECTION LEFT SHOULDER (ARTHROGRAM)  12/27/2023    [place] SURGICAL ARTHROSCOPY SHOULDER REPAIR SLAP LESION Left 2/2/2024    Procedure: ARTHROSCOPY SHOULDER- left Diagnostic;  Surgeon: Sylvia Clayton DO;  Location:  MAIN OR;  Service: Orthopedics    WISDOM TOOTH EXTRACTION Bilateral     bottom wisdom teeth removed     Family History   Problem Relation Age of Onset    Anxiety disorder Mother     Endometriosis Mother     Migraines Mother     Seizures Mother     Chiari malformation Mother     No Known Problems Father     Anxiety disorder Sister     Irritable bowel syndrome Sister     Diabetes Maternal Grandmother     Hypertension Maternal Grandfather     Diabetes Paternal Grandmother     Mental illness Neg Hx     Addiction problem Neg Hx       reports that she has never smoked. She has never been exposed to tobacco smoke. She has never used smokeless tobacco. She reports that she does not currently use alcohol. She reports that she does not use drugs.  Current Outpatient Medications   Medication Instructions    cyclobenzaprine (FLEXERIL) 10 mg, Oral, 2 times daily PRN, May make you drowsy or dizzy. Do not drive or operate machinery until you know how this medication affects you, as it may cause lethargy and mental cloudiness. Drive with caution. May cause blurred vision. Avoid alcohol/other drugs that make you sleepy    methylPREDNISolone 4 MG tablet therapy pack Use as directed on package    Multiple Vitamin (multivitamin) capsule 1 capsule, Daily    Sodium Fluoride 5000 PPM 1.1 % PSTE USE LIKE TOOTHPASTE. DO NOT RINSE, DRINK OR EAT FOR 30 MINUTES AFTER USE.     Allergies   Allergen Reactions    Procaine Rash         Objective   /76 (BP Location: Right arm, Patient Position: Sitting, Cuff Size: Standard)   Ht 5' 6\" (1.676 " m)   Wt 66.2 kg (146 lb)   LMP 02/15/2025   BMI 23.57 kg/m²      Physical Exam  Vitals and nursing note reviewed.   Constitutional:       Appearance: Normal appearance. She is normal weight.   HENT:      Head: Normocephalic and atraumatic.   Eyes:      Extraocular Movements: Extraocular movements intact.      Conjunctiva/sclera: Conjunctivae normal.      Pupils: Pupils are equal, round, and reactive to light.   Pulmonary:      Effort: Pulmonary effort is normal.   Skin:     General: Skin is warm.   Neurological:      General: No focal deficit present.      Mental Status: She is alert and oriented to person, place, and time.   Psychiatric:         Mood and Affect: Mood normal.         Behavior: Behavior normal.         Administrative Statements   I have spent a total time of 30 minutes in caring for this patient on the day of the visit/encounter including Diagnostic results, Prognosis, Risks and benefits of tx options, Instructions for management, Impressions, Counseling / Coordination of care, Documenting in the medical record, Reviewing/placing orders in the medical record (including tests, medications, and/or procedures), and Obtaining or reviewing history  .

## 2025-03-04 ENCOUNTER — CONSULT (OUTPATIENT)
Dept: PAIN MEDICINE | Facility: CLINIC | Age: 20
End: 2025-03-04
Payer: COMMERCIAL

## 2025-03-04 VITALS — BODY MASS INDEX: 23.46 KG/M2 | WEIGHT: 146 LBS | HEIGHT: 66 IN

## 2025-03-04 DIAGNOSIS — M53.3 COCCYX PAIN: Primary | ICD-10-CM

## 2025-03-04 DIAGNOSIS — M51.369 DEGENERATION OF INTERVERTEBRAL DISC OF LUMBAR REGION, UNSPECIFIED WHETHER PAIN PRESENT: ICD-10-CM

## 2025-03-04 PROCEDURE — 99203 OFFICE O/P NEW LOW 30 MIN: CPT | Performed by: PAIN MEDICINE

## 2025-03-04 RX ORDER — DICLOFENAC SODIUM 25 MG/1
25 TABLET, DELAYED RELEASE ORAL 2 TIMES DAILY
Qty: 60 TABLET | Refills: 1 | Status: SHIPPED | OUTPATIENT
Start: 2025-03-04

## 2025-03-04 NOTE — PROGRESS NOTES
Assessment  1. Coccyx pain  -     diclofenac sodium (VOLTAREN) 25 MG EC tablet; Take 1 tablet (25 mg total) by mouth 2 (two) times a day  2. Degeneration of intervertebral disc of lumbar region, unspecified whether pain present  -     diclofenac sodium (VOLTAREN) 25 MG EC tablet; Take 1 tablet (25 mg total) by mouth 2 (two) times a day        19-year-old female with history of tailbone pain ongoing for the past several months after working out at the gym no pain on palpation of the tailbone she does have L5-S1 disc disease with minimal disc protrusion which could be contributing to her pain symptoms her pain also increases when she tenses her buttocks muscles.  This pain is setting of degenerative disc disease at L5-S1.  Will recommend starting diclofenac 25 mg twice daily follow-up in 1 month if symptoms are still persisting consider caudal epidural.        My impressions and treatment recommendations were discussed in detail with the patient who verbalized understanding and had no further questions.  Discharge instructions were provided. I personally saw and examined the patient and I agree with the above discussed plan of care.    Plan      New Medications Ordered This Visit   Medications   • diclofenac sodium (VOLTAREN) 25 MG EC tablet     Sig: Take 1 tablet (25 mg total) by mouth 2 (two) times a day     Dispense:  60 tablet     Refill:  1         No orders of the defined types were placed in this encounter.      History of Present Illness    Betsy Vivar is a 19 y.o. female with relevant PMH of tailbone pain that started in end of May after going to the gym and working out after a long period of hiatus symptoms abated then pain returned back in October 2020 for pain symptoms increasing with the same for long period of time after about 1520 minutes she does use a donut pillow which does not help she did physical therapy 6 weeks in last 6 months without benefit denies radicular symptoms denies weakness in  lower extremities.        I have personally reviewed and/or updated the patient's past medical history, past surgical history, family history, social history, current medications, allergies, and vital signs today.     Review of Systems   Musculoskeletal:  Positive for back pain.   All other systems reviewed and are negative.      Patient Active Problem List   Diagnosis   • Hyperhidrosis of palms   • Axillary hyperhidrosis   • Internal derangement of left shoulder   • Tear of left glenoid labrum   • Chronic midline low back pain without sciatica   • Chronic pain of right knee       Past Medical History:   Diagnosis Date   • Headache    • Ovarian cyst        Past Surgical History:   Procedure Laterality Date   • [place] INJECTION LEFT SHOULDER (ARTHROGRAM)  12/27/2023   • [place] SURGICAL ARTHROSCOPY SHOULDER REPAIR SLAP LESION Left 2/2/2024    Procedure: ARTHROSCOPY SHOULDER- left Diagnostic;  Surgeon: Sylvia Clayton DO;  Location:  MAIN OR;  Service: Orthopedics   • WISDOM TOOTH EXTRACTION Bilateral     bottom wisdom teeth removed       Family History   Problem Relation Age of Onset   • Anxiety disorder Mother    • Endometriosis Mother    • Migraines Mother    • Seizures Mother    • Chiari malformation Mother    • No Known Problems Father    • Anxiety disorder Sister    • Irritable bowel syndrome Sister    • Diabetes Maternal Grandmother    • Hypertension Maternal Grandfather    • Diabetes Paternal Grandmother    • Mental illness Neg Hx    • Addiction problem Neg Hx        Social History     Occupational History   • Not on file   Tobacco Use   • Smoking status: Never     Passive exposure: Never   • Smokeless tobacco: Never   Vaping Use   • Vaping status: Never Used   Substance and Sexual Activity   • Alcohol use: Not Currently   • Drug use: Never   • Sexual activity: Never     Partners: Male       Current Outpatient Medications on File Prior to Visit   Medication Sig   • methylPREDNISolone 4 MG tablet therapy pack Use as  "directed on package   • Multiple Vitamin (multivitamin) capsule Take 1 capsule by mouth daily   • Sodium Fluoride 5000 PPM 1.1 % PSTE USE LIKE TOOTHPASTE. DO NOT RINSE, DRINK OR EAT FOR 30 MINUTES AFTER USE.   • cyclobenzaprine (FLEXERIL) 10 mg tablet Take 1 tablet (10 mg total) by mouth 2 (two) times a day as needed for muscle spasms May make you drowsy or dizzy. Do not drive or operate machinery until you know how this medication affects you, as it may cause lethargy and mental cloudiness. Drive with caution. May cause blurred vision. Avoid alcohol/other drugs that make you sleepy (Patient not taking: Reported on 3/4/2025)     No current facility-administered medications on file prior to visit.       Allergies   Allergen Reactions   • Procaine Rash         Physical Exam    Ht 5' 6\" (1.676 m)   Wt 66.2 kg (146 lb)   LMP 02/15/2025   BMI 23.57 kg/m²     MSK:      Lumbar Spine:  No masses or atrophy,    Range of motion - Decreased extension/flexion  Palpation - no Tenderness to palpation in the lumbar parapsinals   PSIS tenderness no  Fan's/WERO no  Gaenslen's no  SLR no       Strength Right Left   Hip flexion L1,2 5 5   Knee extension L3 5 5   Ankle dorsiflexion L4 5 5   Great toe extension L5 5 5   Ankle Plantarflexion S1 5 5       Sensory Exam:  intact to light touch bilateral LE       Reflexes:     Right Left   Biceps 2+ 2+   Triceps 2+ 2+   Brachioradialis 2+ 2+   Patellar 2+ 2+   Achilles 2+ 2+   Babinski neg neg        Gait normal    No pain on palpation of the coccyx              Imaging    MRI L spine    L1-L2: There is no spinal stenosis or foraminal narrowing.     L2-L3 : There is no spinal stenosis or foraminal narrowing.     L3-L4: There is no spinal stenosis or foraminal narrowing.     L4-L5: Minimal central protrusion. Patent spinal canal and neural foramina.     L5-S1: Minimal broad-based protrusion. Minimal spinal canal narrowing. No neural   foraminal narrowing     Conus and lower thoracic " cord: The conus is normal in position and signal   intensity.     MRI pelvis  IMPRESSION:     Normal MRI of the bony pelvis.

## 2025-04-08 ENCOUNTER — OFFICE VISIT (OUTPATIENT)
Age: 20
End: 2025-04-08
Payer: COMMERCIAL

## 2025-04-08 VITALS — HEIGHT: 66 IN | WEIGHT: 146 LBS | BODY MASS INDEX: 23.46 KG/M2

## 2025-04-08 DIAGNOSIS — M99.04 SEGMENTAL DYSFUNCTION OF SACRAL REGION: ICD-10-CM

## 2025-04-08 DIAGNOSIS — M99.03 SEGMENTAL DYSFUNCTION OF LUMBAR REGION: Primary | ICD-10-CM

## 2025-04-08 DIAGNOSIS — G89.29 CHRONIC MIDLINE LOW BACK PAIN WITHOUT SCIATICA: ICD-10-CM

## 2025-04-08 DIAGNOSIS — M53.3 COCCYXDYNIA: ICD-10-CM

## 2025-04-08 DIAGNOSIS — M54.50 CHRONIC MIDLINE LOW BACK PAIN WITHOUT SCIATICA: ICD-10-CM

## 2025-04-08 DIAGNOSIS — M99.02 SEGMENTAL DYSFUNCTION OF THORACIC REGION: ICD-10-CM

## 2025-04-08 PROCEDURE — 98941 CHIROPRACT MANJ 3-4 REGIONS: CPT | Performed by: CHIROPRACTOR

## 2025-04-08 PROCEDURE — 97110 THERAPEUTIC EXERCISES: CPT | Performed by: CHIROPRACTOR

## 2025-04-08 PROCEDURE — 99203 OFFICE O/P NEW LOW 30 MIN: CPT | Performed by: CHIROPRACTOR

## 2025-04-08 NOTE — PROGRESS NOTES
Diagnoses and all orders for this visit:    Segmental dysfunction of lumbar region    Chronic midline low back pain without sciatica    Segmental dysfunction of sacral region    Segmental dysfunction of thoracic region    Coccyxdynia      ASSESSMENT:  No red flags, radiculopathy or neurologic deficit appreciated clinically. Pt's symptoms and exam findings consistent with mechanical lbp with possible clinical inflammatory radiculopathy secondary to repetitive st/sp injury of discogenic origin. Radicular symptoms were not reproduced at initial exam today. Pt tolerated extension biased stretches well but there was not radicular symptoms reproduced during exam and manual mobilization of the affected spinal and myofascial tissues with increased ROM; trial of conservative tx recommended consisting on Verito extension biased exercises, graded mobilization/manipulation of the affected tissues, postural/ergonomic education and take home stretches/exercises. If symptoms fail to centralize or neurologic deficit presents, appropriate imaging and referral will be coordinated.  Spent greater than 30 min c pt discussing hx, pe, ddx, tx options and reviewing intake notes/imaging    PROCEDURE CODES: 89600 and 42042, 28312    TREATMENT:  Fear avoidance behavior discussion, encouraged and reassured pt that natural course of condition is to improve over time with adherence to tx plan and home care strategies. Home care recommendations: walk (but avoid hills/trails), gradual return to activity to tolerance (avoid anything that peripheralizes symptoms), call if symptoms peripheralize, worsen, or neurologic deficit progresses. Ther-ex: IASTM to affected mm hypertonicities (discussed soreness/ecchymosis up to 36 hrs post procedure); prone on elbows, prone push-ups at shoulders, standing lumbopelvic extension, hip flexor ischemic compression, piriformis ischemic compression, glute bridge, transitional mvmt education, abdominal bracing;  greater than 15 spent on above mentioned ther-ex. Thoracic mobilization/manipulation: prone P-A mob,; Lumbar mobilization/manipulation: extension distraction; side posture HVLA, SIJ Manipulation/Mobilization: R/L SIJ HVLA - long axis distraction        HPI  Betsy Vivar is a 19 y.o. female  Chief Complaint   Patient presents with   • Back Pain     Low back pain/tailbone in the  enter, intermittent, worse when sitting down, achy, going on for about a 6 months, no pain currently, about an 8 at its worst      The patient presents to the office with lower sacral/ tailbone pain with tightness in the lower back that started in October 2024 doing back bends on the beach while on vacation. She felt a pinch and pain has continued since onset. PT helped a little with deep core exercises but the pinch is still there. Chiropractic treatment- adjust tailbone and hips with no mm work- did 8 treatments with almost a weeks work of improvements before the pinching came back. She had to stop due to insurance coverage. The patient reports she reports treatment with [facility] in [place] which helps a little. Uma Hatch PA-C referred pt to our office for consult. Aggravating factors include-sitting more than 30 mins, Pain is rated 8/10 at its worst,  No pain really today. Improvements with more she moves the better she feels.  Lumbar Xray 10/30/24- Slight L scoliosis in Lumbar, 11/6/24- Flex/Ext Lumbar xrays- WNL, 11/20/24-Pelvic MRI- Normal, 1/22/25 Lumbar MRI- L4-L5: Minimal central protrusion. Patent spinal canal and neural foramina. L5-S1: Minimal broad-based protrusion. Minimal spinal canal narrowing. No neural foraminal narrowing       Back Pain  Pertinent negatives include no chest pain, fever, headaches, numbness or weakness.     Past Medical History:   Diagnosis Date   • Headache    • Ovarian cyst       Past Surgical History:   Procedure Laterality Date   • [place] INJECTION LEFT SHOULDER (ARTHROGRAM)   12/27/2023   • [place] SURGICAL ARTHROSCOPY SHOULDER REPAIR SLAP LESION Left 2/2/2024    Procedure: ARTHROSCOPY SHOULDER- left Diagnostic;  Surgeon: Sylvia Clayton DO;  Location:  MAIN OR;  Service: Orthopedics   • WISDOM TOOTH EXTRACTION Bilateral     bottom wisdom teeth removed     The following portions of the patient's history were reviewed and updated as appropriate: allergies, past family history, past medical history, past social history, past surgical history, and problem list.  Review of Systems   Constitutional:  Negative for activity change, fatigue, fever and unexpected weight change.   HENT:  Negative for ear pain, hearing loss, sinus pressure, sinus pain, sore throat and tinnitus.    Respiratory:  Negative for chest tightness, shortness of breath, wheezing and stridor.    Cardiovascular:  Negative for chest pain.   Genitourinary:  Negative for flank pain and frequency.   Musculoskeletal:  Positive for back pain and myalgias. Negative for joint swelling and neck pain.   Skin:  Negative for color change and pallor.   Neurological:  Negative for dizziness, speech difficulty, weakness, numbness and headaches.   Psychiatric/Behavioral:  Negative for agitation and sleep disturbance. The patient is not nervous/anxious.      Physical Exam  Constitutional:       General: She is not in acute distress.     Appearance: Normal appearance.   HENT:      Head: Normocephalic.      Mouth/Throat:      Mouth: Mucous membranes are moist.   Eyes:      Extraocular Movements: Extraocular movements intact.      Conjunctiva/sclera: Conjunctivae normal.      Pupils: Pupils are equal, round, and reactive to light.   Neck:      Vascular: No carotid bruit.   Pulmonary:      Effort: Pulmonary effort is normal.   Chest:      Chest wall: No tenderness.   Abdominal:      General: Abdomen is flat.      Palpations: Abdomen is soft.   Musculoskeletal:         General: Tenderness present. No swelling, deformity or signs of injury. Normal range  of motion.      Cervical back: Normal range of motion. No rigidity or tenderness.        Back:       Right lower leg: No edema.      Left lower leg: No edema.   Lymphadenopathy:      Cervical: No cervical adenopathy.   Skin:     General: Skin is warm.      Coloration: Skin is not jaundiced or pale.      Findings: No bruising or erythema.   Neurological:      Mental Status: She is alert and oriented to person, place, and time.      Cranial Nerves: No cranial nerve deficit.      Sensory: No sensory deficit.      Motor: No weakness.      Gait: Gait is intact.      Deep Tendon Reflexes: Reflexes are normal and symmetric.   Psychiatric:         Attention and Perception: Attention normal.         Mood and Affect: Mood and affect normal.         Speech: Speech normal.         Behavior: Behavior normal. Behavior is cooperative.         Thought Content: Thought content normal.         Cognition and Memory: Cognition normal.         Judgment: Judgment normal.       SOFT TISSUE ASSESSMENT: Hypertonicity and tenderness palpated B T10-S1 erector spinae, hip flexor, glute med/min JOINT RESTRICTIONS: T8-10, L3-S1 and L SIJ ORTHO: SI jt point tenderness: +; Verito repeated flexion peripheralizes, extension centralizes; bon's, iliac compression, thigh thrust elicit stiffness in R/L SIJ; prone femoral nerve stretch neg for upper lumbar neural tension, elicits L SIJ stiffness; sitting root elicits lbp on R/L; slump test elicits neural tension into RLE/LLE    Return in about 1 week (around 4/15/2025) for Recheck.

## 2025-04-15 ENCOUNTER — OFFICE VISIT (OUTPATIENT)
Dept: PAIN MEDICINE | Facility: CLINIC | Age: 20
End: 2025-04-15
Payer: COMMERCIAL

## 2025-04-15 VITALS — HEIGHT: 66 IN | WEIGHT: 145 LBS | BODY MASS INDEX: 23.3 KG/M2

## 2025-04-15 DIAGNOSIS — M79.18 MYOFASCIAL PAIN SYNDROME: ICD-10-CM

## 2025-04-15 DIAGNOSIS — M51.16 LUMBAR DISC DISEASE WITH RADICULOPATHY: Primary | ICD-10-CM

## 2025-04-15 PROCEDURE — 99212 OFFICE O/P EST SF 10 MIN: CPT | Performed by: PAIN MEDICINE

## 2025-04-15 NOTE — PROGRESS NOTES
Assessment  1. Lumbar disc disease with radiculopathy  2. Myofascial pain syndrome          19-year-old female with history of tailbone pain ongoing for the past several months after working out at the gym no pain on palpation of the tailbone she does have L5-S1 disc disease with minimal disc protrusion which could be contributing to her pain symptoms her pain also increases when she tenses her buttocks muscles.  This pain is setting of degenerative disc disease at L5-S1.  This pain has reduced will follow up in 3 months, diclofenac as needed.        My impressions and treatment recommendations were discussed in detail with the patient who verbalized understanding and had no further questions.  Discharge instructions were provided. I personally saw and examined the patient and I agree with the above discussed plan of care.    Plan      No orders of the defined types were placed in this encounter.        No orders of the defined types were placed in this encounter.      History of Present Illness  F/u 4/15/25  Doing better with buttocks and tail bone pain  80% reduction since last visit  Taking diclofenac as needed      consult  Betsy IRENE Vivar is a 19 y.o. female with relevant PMH of tailbone pain that started in end of May after going to the gym and working out after a long period of hiatus symptoms abated then pain returned back in October 2020 for pain symptoms increasing with the same for long period of time after about 1520 minutes she does use a donut pillow which does not help she did physical therapy 6 weeks in last 6 months without benefit denies radicular symptoms denies weakness in lower extremities.        I have personally reviewed and/or updated the patient's past medical history, past surgical history, family history, social history, current medications, allergies, and vital signs today.     Review of Systems   Musculoskeletal:  Positive for back pain.   All other systems reviewed and are  negative.      Patient Active Problem List   Diagnosis   • Hyperhidrosis of palms   • Axillary hyperhidrosis   • Internal derangement of left shoulder   • Tear of left glenoid labrum   • Chronic midline low back pain without sciatica   • Chronic pain of right knee       Past Medical History:   Diagnosis Date   • Headache    • Ovarian cyst        Past Surgical History:   Procedure Laterality Date   • [place] INJECTION LEFT SHOULDER (ARTHROGRAM)  12/27/2023   • [place] SURGICAL ARTHROSCOPY SHOULDER REPAIR SLAP LESION Left 2/2/2024    Procedure: ARTHROSCOPY SHOULDER- left Diagnostic;  Surgeon: Sylvia Clayton DO;  Location:  MAIN OR;  Service: Orthopedics   • WISDOM TOOTH EXTRACTION Bilateral     bottom wisdom teeth removed       Family History   Problem Relation Age of Onset   • Anxiety disorder Mother    • Endometriosis Mother    • Migraines Mother    • Seizures Mother    • Chiari malformation Mother    • No Known Problems Father    • Anxiety disorder Sister    • Irritable bowel syndrome Sister    • Diabetes Maternal Grandmother    • Hypertension Maternal Grandfather    • Diabetes Paternal Grandmother    • Mental illness Neg Hx    • Addiction problem Neg Hx        Social History     Occupational History   • Not on file   Tobacco Use   • Smoking status: Never     Passive exposure: Never   • Smokeless tobacco: Never   Vaping Use   • Vaping status: Never Used   Substance and Sexual Activity   • Alcohol use: Not Currently   • Drug use: Never   • Sexual activity: Never     Partners: Male       Current Outpatient Medications on File Prior to Visit   Medication Sig   • diclofenac sodium (VOLTAREN) 25 MG EC tablet Take 1 tablet (25 mg total) by mouth 2 (two) times a day   • Multiple Vitamin (multivitamin) capsule Take 1 capsule by mouth daily   • Sodium Fluoride 5000 PPM 1.1 % PSTE USE LIKE TOOTHPASTE. DO NOT RINSE, DRINK OR EAT FOR 30 MINUTES AFTER USE.   • cyclobenzaprine (FLEXERIL) 10 mg tablet Take 1 tablet (10 mg total) by mouth  "2 (two) times a day as needed for muscle spasms May make you drowsy or dizzy. Do not drive or operate machinery until you know how this medication affects you, as it may cause lethargy and mental cloudiness. Drive with caution. May cause blurred vision. Avoid alcohol/other drugs that make you sleepy (Patient not taking: Reported on 4/15/2025)   • methylPREDNISolone 4 MG tablet therapy pack Use as directed on package (Patient not taking: Reported on 4/15/2025)     No current facility-administered medications on file prior to visit.       Allergies   Allergen Reactions   • Procaine Rash         Physical Exam    Ht 5' 6\" (1.676 m)   Wt 65.8 kg (145 lb)   BMI 23.40 kg/m²     MSK:      Lumbar Spine:  No masses or atrophy,    Range of motion - Decreased extension/flexion  Palpation - no Tenderness to palpation in the lumbar parapsinals   PSIS tenderness no  Fan's/WERO no  Gaenslen's no  SLR no       Strength Right Left   Hip flexion L1,2 5 5   Knee extension L3 5 5   Ankle dorsiflexion L4 5 5   Great toe extension L5 5 5   Ankle Plantarflexion S1 5 5       Sensory Exam:  intact to light touch bilateral LE       Reflexes:     Right Left   Biceps 2+ 2+   Triceps 2+ 2+   Brachioradialis 2+ 2+   Patellar 2+ 2+   Achilles 2+ 2+   Babinski neg neg        Gait normal    No pain on palpation of the coccyx              Imaging    MRI L spine    L1-L2: There is no spinal stenosis or foraminal narrowing.     L2-L3 : There is no spinal stenosis or foraminal narrowing.     L3-L4: There is no spinal stenosis or foraminal narrowing.     L4-L5: Minimal central protrusion. Patent spinal canal and neural foramina.     L5-S1: Minimal broad-based protrusion. Minimal spinal canal narrowing. No neural   foraminal narrowing     Conus and lower thoracic cord: The conus is normal in position and signal   intensity.     MRI pelvis  IMPRESSION:     Normal MRI of the bony pelvis.       "

## 2025-04-21 ENCOUNTER — PROCEDURE VISIT (OUTPATIENT)
Age: 20
End: 2025-04-21
Payer: COMMERCIAL

## 2025-04-21 VITALS — WEIGHT: 145 LBS | HEIGHT: 66 IN | BODY MASS INDEX: 23.3 KG/M2

## 2025-04-21 DIAGNOSIS — M99.03 SEGMENTAL DYSFUNCTION OF LUMBAR REGION: Primary | ICD-10-CM

## 2025-04-21 DIAGNOSIS — M99.02 SEGMENTAL DYSFUNCTION OF THORACIC REGION: ICD-10-CM

## 2025-04-21 DIAGNOSIS — M54.50 CHRONIC MIDLINE LOW BACK PAIN WITHOUT SCIATICA: ICD-10-CM

## 2025-04-21 DIAGNOSIS — M53.3 COCCYXDYNIA: ICD-10-CM

## 2025-04-21 DIAGNOSIS — M99.04 SEGMENTAL DYSFUNCTION OF SACRAL REGION: ICD-10-CM

## 2025-04-21 DIAGNOSIS — G89.29 CHRONIC MIDLINE LOW BACK PAIN WITHOUT SCIATICA: ICD-10-CM

## 2025-04-21 PROCEDURE — 97110 THERAPEUTIC EXERCISES: CPT | Performed by: CHIROPRACTOR

## 2025-04-21 PROCEDURE — 98941 CHIROPRACT MANJ 3-4 REGIONS: CPT | Performed by: CHIROPRACTOR

## 2025-04-21 NOTE — PROGRESS NOTES
Diagnoses and all orders for this visit:    Segmental dysfunction of lumbar region    Chronic midline low back pain without sciatica    Segmental dysfunction of sacral region    Segmental dysfunction of thoracic region    Coccyxdynia      ASSESSMENT:  Pt's symptoms and exam findings consistent with mechanical lbp with possible clinical inflammatory radiculopathy secondary to repetitive st/sp injury of discogenic origin. Radicular symptoms were not reproduced at initial exam today. Pt tolerated extension biased stretches well but there was not radicular symptoms reproduced during exam and manual mobilization of the affected spinal and myofascial tissues with increased ROM; trial of conservative tx recommended consisting on Verito extension biased exercises, graded mobilization/manipulation of the affected tissues, postural/ergonomic education and take home stretches/exercises. If symptoms fail to centralize or neurologic deficit presents, appropriate imaging and referral will be coordinated.      PROCEDURE CODES: 93297 and 36515    TREATMENT:  Fear avoidance behavior discussion, encouraged and reassured pt that natural course of condition is to improve over time with adherence to tx plan and home care strategies. Home care recommendations: walk (but avoid hills/trails), gradual return to activity to tolerance (avoid anything that peripheralizes symptoms), call if symptoms peripheralize, worsen, or neurologic deficit progresses. Ther-ex: IASTM to affected mm hypertonicities (discussed soreness/ecchymosis up to 36 hrs post procedure); prone on elbows, prone push-ups at shoulders, standing lumbopelvic extension, hip flexor ischemic compression, piriformis ischemic compression, glute bridge, transitional mvmt education, abdominal bracing; greater than 15 spent on above mentioned ther-ex. Thoracic mobilization/manipulation: prone P-A mob,; Lumbar mobilization/manipulation: extension distraction; side posture HVLA, SIJ  Manipulation/Mobilization: R/L SIJ HVLA - long axis distraction        HPI  Betsy IRENE Vivar is a 19 y.o. female  Chief Complaint   Patient presents with   • Back Pain     Lower back pain-0     The patient presents to the office with lower sacral/ tailbone pain with tightness in the lower back that started in October 2024 doing back bends on the beach while on vacation. She felt a pinch and pain has continued since onset. PT helped a little with deep core exercises but the pinch is still there. Chiropractic treatment- adjust tailbone and hips with no mm work- did 8 treatments with almost a weeks work of improvements before the pinching came back. She had to stop due to insurance coverage. The patient reports she reports treatment with [facility] in [place] which helps a little. Uma Hatch PA-C referred pt to our office for consult. Aggravating factors include-sitting more than 30 mins, Pain is rated 8/10 at its worst,  No pain really today. Improvements with more she moves the better she feels.  Lumbar Xray 10/30/24- Slight L scoliosis in Lumbar, 11/6/24- Flex/Ext Lumbar xrays- WNL, 11/20/24-Pelvic MRI- Normal, 1/22/25 Lumbar MRI- L4-L5: Minimal central protrusion. Patent spinal canal and neural foramina. L5-S1: Minimal broad-based protrusion. Minimal spinal canal narrowing. No neural foraminal narrowing   4/21/25- The patient reports no pain level since last visit, no pain at all this past week. Continued to activities.       Back Pain      Past Medical History:   Diagnosis Date   • Headache    • Ovarian cyst       Past Surgical History:   Procedure Laterality Date   • [place] INJECTION LEFT SHOULDER (ARTHROGRAM)  12/27/2023   • [place] SURGICAL ARTHROSCOPY SHOULDER REPAIR SLAP LESION Left 2/2/2024    Procedure: ARTHROSCOPY SHOULDER- left Diagnostic;  Surgeon: Sylvia Clayton DO;  Location:  MAIN OR;  Service: Orthopedics   • WISDOM TOOTH EXTRACTION Bilateral     bottom wisdom teeth removed     The  following portions of the patient's history were reviewed and updated as appropriate: allergies, past family history, past medical history, past social history, past surgical history, and problem list.  Review of Systems   Musculoskeletal:  Positive for back pain and myalgias.     Physical Exam  Constitutional:       Appearance: Normal appearance.   Musculoskeletal:         General: Tenderness present. Normal range of motion.      Cervical back: Normal range of motion.        Back:    Neurological:      Mental Status: She is alert and oriented to person, place, and time.      Gait: Gait is intact.      Deep Tendon Reflexes: Reflexes are normal and symmetric.   Psychiatric:         Attention and Perception: Attention normal.         Mood and Affect: Mood and affect normal.         Speech: Speech normal.         Behavior: Behavior normal. Behavior is cooperative.         Thought Content: Thought content normal.         Cognition and Memory: Cognition normal.         Judgment: Judgment normal.       SOFT TISSUE ASSESSMENT: Hypertonicity and tenderness palpated B T10-S1 erector spinae, hip flexor, glute med/min JOINT RESTRICTIONS: T8-10, L3-S1 and L SIJ ORTHO: SI jt point tenderness: +; Verito repeated flexion peripheralizes, extension centralizes; bon's, iliac compression, thigh thrust elicit stiffness in R/L SIJ; prone femoral nerve stretch neg for upper lumbar neural tension, elicits L SIJ stiffness; sitting root elicits lbp on R/L; slump test elicits neural tension into RLE/LLE    Return in about 1 week (around 4/28/2025) for Recheck.

## 2025-05-13 ENCOUNTER — PROCEDURE VISIT (OUTPATIENT)
Age: 20
End: 2025-05-13
Payer: COMMERCIAL

## 2025-05-13 VITALS
HEIGHT: 66 IN | WEIGHT: 145 LBS | DIASTOLIC BLOOD PRESSURE: 82 MMHG | BODY MASS INDEX: 23.3 KG/M2 | SYSTOLIC BLOOD PRESSURE: 114 MMHG | HEART RATE: 84 BPM

## 2025-05-13 DIAGNOSIS — M53.3 COCCYXDYNIA: ICD-10-CM

## 2025-05-13 DIAGNOSIS — M99.04 SEGMENTAL DYSFUNCTION OF SACRAL REGION: ICD-10-CM

## 2025-05-13 DIAGNOSIS — G89.29 CHRONIC MIDLINE LOW BACK PAIN WITHOUT SCIATICA: ICD-10-CM

## 2025-05-13 DIAGNOSIS — M99.03 SEGMENTAL DYSFUNCTION OF LUMBAR REGION: Primary | ICD-10-CM

## 2025-05-13 DIAGNOSIS — M54.50 CHRONIC MIDLINE LOW BACK PAIN WITHOUT SCIATICA: ICD-10-CM

## 2025-05-13 DIAGNOSIS — M99.02 SEGMENTAL DYSFUNCTION OF THORACIC REGION: ICD-10-CM

## 2025-05-13 PROCEDURE — 98941 CHIROPRACT MANJ 3-4 REGIONS: CPT | Performed by: CHIROPRACTOR

## 2025-05-13 PROCEDURE — 97110 THERAPEUTIC EXERCISES: CPT | Performed by: CHIROPRACTOR

## 2025-05-13 NOTE — PROGRESS NOTES
Diagnoses and all orders for this visit:    Segmental dysfunction of lumbar region    Chronic midline low back pain without sciatica    Segmental dysfunction of sacral region    Segmental dysfunction of thoracic region    Coccyxdynia      ASSESSMENT:  Pt's symptoms and exam findings consistent with mechanical lbp with possible clinical inflammatory radiculopathy secondary to repetitive st/sp injury of discogenic origin. Radicular symptoms were not reproduced at initial exam today. Pt tolerated extension biased stretches well but there was not radicular symptoms reproduced during exam and manual mobilization of the affected spinal and myofascial tissues with increased ROM; trial of conservative tx recommended consisting on Verito extension biased exercises, graded mobilization/manipulation of the affected tissues, postural/ergonomic education and take home stretches/exercises. If symptoms fail to centralize or neurologic deficit presents, appropriate imaging and referral will be coordinated.      PROCEDURE CODES: 97161 and 06923    TREATMENT:  Fear avoidance behavior discussion, encouraged and reassured pt that natural course of condition is to improve over time with adherence to tx plan and home care strategies. Home care recommendations: walk (but avoid hills/trails), gradual return to activity to tolerance (avoid anything that peripheralizes symptoms), call if symptoms peripheralize, worsen, or neurologic deficit progresses. Ther-ex: IASTM to affected mm hypertonicities (discussed soreness/ecchymosis up to 36 hrs post procedure); prone on elbows, prone push-ups at shoulders, standing lumbopelvic extension, hip flexor ischemic compression, piriformis ischemic compression, glute bridge, transitional mvmt education, abdominal bracing; greater than 15 spent on above mentioned ther-ex. Thoracic mobilization/manipulation: prone P-A mob,; Lumbar mobilization/manipulation: extension distraction; side posture HVLA, SIJ  Manipulation/Mobilization: R/L SIJ HVLA - long axis distraction        HPI  Betsy Vivar is a 19 y.o. female  Chief Complaint   Patient presents with   • Neck - Pain     Neck pain score 2       • Back Pain     Lower lumbar is feeling better. Pain score 0     Both hips are sore  Pain score 0-1     The patient presents to the office with lower sacral/ tailbone pain with tightness in the lower back that started in October 2024 doing back bends on the beach while on vacation. She felt a pinch and pain has continued since onset. PT helped a little with deep core exercises but the pinch is still there. Chiropractic treatment- adjust tailbone and hips with no mm work- did 8 treatments with almost a weeks work of improvements before the pinching came back. She had to stop due to insurance coverage. The patient reports she reports treatment with [facility] in [place] which helps a little. Uma Hatch PA-C referred pt to our office for consult. Aggravating factors include-sitting more than 30 mins, Pain is rated 8/10 at its worst,  No pain really today. Improvements with more she moves the better she feels.  Lumbar Xray 10/30/24- Slight L scoliosis in Lumbar, 11/6/24- Flex/Ext Lumbar xrays- WNL, 11/20/24-Pelvic MRI- Normal, 1/22/25 Lumbar MRI- L4-L5: Minimal central protrusion. Patent spinal canal and neural foramina. L5-S1: Minimal broad-based protrusion. Minimal spinal canal narrowing. No neural foraminal narrowing   4/21/25- The patient reports no pain level since last visit, no pain at all this past week. Continued to activities.   5/13/25- The patient reports she is feeling better overall.  2/10 and lower back tail bone 0/10, hips are sore 0-1/10. Since starting treatment she has had less frequent coccyx pain.       Back Pain      Past Medical History:   Diagnosis Date   • Headache    • Ovarian cyst       Past Surgical History:   Procedure Laterality Date   • [place] INJECTION LEFT SHOULDER (ARTHROGRAM)   12/27/2023   • [place] SURGICAL ARTHROSCOPY SHOULDER REPAIR SLAP LESION Left 2/2/2024    Procedure: ARTHROSCOPY SHOULDER- left Diagnostic;  Surgeon: Sylvia Clayton DO;  Location:  MAIN OR;  Service: Orthopedics   • WISDOM TOOTH EXTRACTION Bilateral     bottom wisdom teeth removed     The following portions of the patient's history were reviewed and updated as appropriate: allergies, past family history, past medical history, past social history, past surgical history, and problem list.  Review of Systems   Musculoskeletal:  Positive for back pain and myalgias.     Physical Exam  Constitutional:       Appearance: Normal appearance.   Musculoskeletal:         General: Tenderness present. Normal range of motion.      Cervical back: Normal range of motion.        Back:    Neurological:      Mental Status: She is alert and oriented to person, place, and time.      Gait: Gait is intact.      Deep Tendon Reflexes: Reflexes are normal and symmetric.   Psychiatric:         Attention and Perception: Attention normal.         Mood and Affect: Mood and affect normal.         Speech: Speech normal.         Behavior: Behavior normal. Behavior is cooperative.         Thought Content: Thought content normal.         Cognition and Memory: Cognition normal.         Judgment: Judgment normal.       SOFT TISSUE ASSESSMENT: Hypertonicity and tenderness palpated B T10-S1 erector spinae, hip flexor, glute med/min JOINT RESTRICTIONS: T8-10, L3-S1 and L SIJ ORTHO: SI jt point tenderness: +; Verito repeated flexion peripheralizes, extension centralizes; bon's, iliac compression, thigh thrust elicit stiffness in R/L SIJ; prone femoral nerve stretch neg for upper lumbar neural tension, elicits L SIJ stiffness; sitting root elicits lbp on R/L; slump test elicits neural tension into RLE/LLE    Return in about 1 week (around 5/20/2025) for Recheck.

## 2025-06-24 ENCOUNTER — OFFICE VISIT (OUTPATIENT)
Age: 20
End: 2025-06-24
Payer: COMMERCIAL

## 2025-06-24 VITALS — WEIGHT: 145 LBS | HEIGHT: 66 IN | BODY MASS INDEX: 23.3 KG/M2

## 2025-06-24 DIAGNOSIS — M54.50 CHRONIC MIDLINE LOW BACK PAIN WITHOUT SCIATICA: ICD-10-CM

## 2025-06-24 DIAGNOSIS — M99.04 SEGMENTAL DYSFUNCTION OF SACRAL REGION: ICD-10-CM

## 2025-06-24 DIAGNOSIS — M99.03 SEGMENTAL DYSFUNCTION OF LUMBAR REGION: Primary | ICD-10-CM

## 2025-06-24 DIAGNOSIS — M99.02 SEGMENTAL DYSFUNCTION OF THORACIC REGION: ICD-10-CM

## 2025-06-24 DIAGNOSIS — M53.3 COCCYXDYNIA: ICD-10-CM

## 2025-06-24 DIAGNOSIS — G89.29 CHRONIC MIDLINE LOW BACK PAIN WITHOUT SCIATICA: ICD-10-CM

## 2025-06-24 PROCEDURE — 97110 THERAPEUTIC EXERCISES: CPT | Performed by: CHIROPRACTOR

## 2025-06-24 PROCEDURE — 98941 CHIROPRACT MANJ 3-4 REGIONS: CPT | Performed by: CHIROPRACTOR

## 2025-06-24 NOTE — PROGRESS NOTES
Diagnoses and all orders for this visit:    Segmental dysfunction of lumbar region    Chronic midline low back pain without sciatica    Segmental dysfunction of sacral region    Segmental dysfunction of thoracic region    Coccyxdynia      ASSESSMENT:  Pt's symptoms and exam findings consistent with mechanical lbp with possible clinical inflammatory radiculopathy secondary to repetitive st/sp injury of discogenic origin. Radicular symptoms were not reproduced at initial exam today. Pt tolerated extension biased stretches well but there was not radicular symptoms reproduced during exam and manual mobilization of the affected spinal and myofascial tissues with increased ROM; trial of conservative tx recommended consisting on Verito extension biased exercises, graded mobilization/manipulation of the affected tissues, postural/ergonomic education and take home stretches/exercises. If symptoms fail to centralize or neurologic deficit presents, appropriate imaging and referral will be coordinated.  6/24/25-The patient tolerated treatment well and is making good progress.    PROCEDURE CODES: 17541 and 16764    TREATMENT:  Fear avoidance behavior discussion, encouraged and reassured pt that natural course of condition is to improve over time with adherence to tx plan and home care strategies. Home care recommendations: walk (but avoid hills/trails), gradual return to activity to tolerance (avoid anything that peripheralizes symptoms), call if symptoms peripheralize, worsen, or neurologic deficit progresses. Ther-ex: IASTM to affected mm hypertonicities (discussed soreness/ecchymosis up to 36 hrs post procedure); prone on elbows, prone push-ups at shoulders, standing lumbopelvic extension, hip flexor ischemic compression, piriformis ischemic compression, glute bridge, transitional mvmt education, abdominal bracing; greater than 15 spent on above mentioned ther-ex. Thoracic mobilization/manipulation: prone P-A mob,; Lumbar  mobilization/manipulation: extension distraction; side posture HVLA, SIJ Manipulation/Mobilization: R/L SIJ HVLA - long axis distraction        HPI  Betsy Vivar is a 20 y.o. female  Chief Complaint   Patient presents with   • Back Pain     Low back no pain feeling good      The patient presents to the office with lower sacral/ tailbone pain with tightness in the lower back that started in October 2024 doing back bends on the beach while on vacation. She felt a pinch and pain has continued since onset. PT helped a little with deep core exercises but the pinch is still there. Chiropractic treatment- adjust tailbone and hips with no mm work- did 8 treatments with almost a weeks work of improvements before the pinching came back. She had to stop due to insurance coverage. The patient reports she reports treatment with [facility] in [place] which helps a little. Uma Hatch PA-C referred pt to our office for consult. Aggravating factors include-sitting more than 30 mins, Pain is rated 8/10 at its worst,  No pain really today. Improvements with more she moves the better she feels.  Lumbar Xray 10/30/24- Slight L scoliosis in Lumbar, 11/6/24- Flex/Ext Lumbar xrays- WNL, 11/20/24-Pelvic MRI- Normal, 1/22/25 Lumbar MRI- L4-L5: Minimal central protrusion. Patent spinal canal and neural foramina. L5-S1: Minimal broad-based protrusion. Minimal spinal canal narrowing. No neural foraminal narrowing   4/21/25- The patient reports no pain level since last visit, no pain at all this past week. Continued to activities.   5/13/25- The patient reports she is feeling better overall.  2/10 and lower back tail bone 0/10, hips are sore 0-1/10. Since starting treatment she has had less frequent coccyx pain.   6/24/25- The patient reports improvements overall, no pain and she can tolerate wearing heels again.      Back Pain      Past Medical History:   Diagnosis Date   • Headache    • Ovarian cyst       Past Surgical  History:   Procedure Laterality Date   • [place] INJECTION LEFT SHOULDER (ARTHROGRAM)  12/27/2023   • [place] SURGICAL ARTHROSCOPY SHOULDER REPAIR SLAP LESION Left 2/2/2024    Procedure: ARTHROSCOPY SHOULDER- left Diagnostic;  Surgeon: Sylvia Clayton DO;  Location:  MAIN OR;  Service: Orthopedics   • WISDOM TOOTH EXTRACTION Bilateral     bottom wisdom teeth removed     The following portions of the patient's history were reviewed and updated as appropriate: allergies, past family history, past medical history, past social history, past surgical history, and problem list.  Review of Systems   Musculoskeletal:  Positive for back pain and myalgias.     Physical Exam  Constitutional:       Appearance: Normal appearance.     Musculoskeletal:         General: Tenderness present. Normal range of motion.      Cervical back: Normal range of motion.        Back:      Neurological:      Mental Status: She is alert and oriented to person, place, and time.      Gait: Gait is intact.      Deep Tendon Reflexes: Reflexes are normal and symmetric.     Psychiatric:         Attention and Perception: Attention normal.         Mood and Affect: Mood and affect normal.         Speech: Speech normal.         Behavior: Behavior normal. Behavior is cooperative.         Thought Content: Thought content normal.         Cognition and Memory: Cognition normal.         Judgment: Judgment normal.       SOFT TISSUE ASSESSMENT: Hypertonicity and tenderness palpated B T10-S1 erector spinae, hip flexor, glute med/min JOINT RESTRICTIONS: T8-10, L3-S1 and L SIJ ORTHO: SI jt point tenderness: +; Verito repeated flexion peripheralizes, extension centralizes; bon's, iliac compression, thigh thrust elicit stiffness in R/L SIJ; prone femoral nerve stretch neg for upper lumbar neural tension, elicits L SIJ stiffness; sitting root elicits lbp on R/L; slump test elicits neural tension into RLE/LLE    Return in about 1 week (around 7/1/2025) for Recheck.

## 2025-07-14 ENCOUNTER — OFFICE VISIT (OUTPATIENT)
Dept: FAMILY MEDICINE CLINIC | Facility: CLINIC | Age: 20
End: 2025-07-14
Payer: COMMERCIAL

## 2025-07-14 VITALS
TEMPERATURE: 97.2 F | DIASTOLIC BLOOD PRESSURE: 70 MMHG | OXYGEN SATURATION: 98 % | RESPIRATION RATE: 17 BRPM | SYSTOLIC BLOOD PRESSURE: 122 MMHG | WEIGHT: 141 LBS | HEART RATE: 95 BPM | HEIGHT: 66 IN | BODY MASS INDEX: 22.66 KG/M2

## 2025-07-14 DIAGNOSIS — M25.59 PAIN IN OTHER JOINT: ICD-10-CM

## 2025-07-14 DIAGNOSIS — N92.6 IRREGULAR MENSES: ICD-10-CM

## 2025-07-14 DIAGNOSIS — R73.9 ELEVATED BLOOD SUGAR: ICD-10-CM

## 2025-07-14 DIAGNOSIS — R53.83 OTHER FATIGUE: Primary | ICD-10-CM

## 2025-07-14 DIAGNOSIS — L70.9 ACNE, UNSPECIFIED ACNE TYPE: ICD-10-CM

## 2025-07-14 PROBLEM — M25.50 JOINT PAIN: Status: ACTIVE | Noted: 2025-07-14

## 2025-07-14 PROCEDURE — 99214 OFFICE O/P EST MOD 30 MIN: CPT | Performed by: NURSE PRACTITIONER

## 2025-07-14 NOTE — PROGRESS NOTES
Name: Betsy Vivar      : 2005      MRN: [id number]  Encounter Provider: WENDI Whitlock  Encounter Date: 2025   Encounter department: [facility]    Assessment & Plan  Other fatigue    Orders:    Zinc; Future    Magnesium; Future    C-reactive protein; Future    Hemoglobin A1C; Future    Insulin, fasting; Future    Comprehensive metabolic panel; Future    Testosterone, free, total; Future    DHEA-sulfate; Future    Progesterone; Future    Estradiol; Future    FSH and LH; Future    Insulin, fasting; Future    Irregular menses    Orders:    Zinc; Future    Magnesium; Future    C-reactive protein; Future    Hemoglobin A1C; Future    Insulin, fasting; Future    Comprehensive metabolic panel; Future    Testosterone, free, total; Future    DHEA-sulfate; Future    Progesterone; Future    Estradiol; Future    FSH and LH; Future    Insulin, fasting; Future    Acne, unspecified acne type    Orders:    Zinc; Future    Magnesium; Future    C-reactive protein; Future    Hemoglobin A1C; Future    Insulin, fasting; Future    Comprehensive metabolic panel; Future    Testosterone, free, total; Future    DHEA-sulfate; Future    Progesterone; Future    Estradiol; Future    FSH and LH; Future    Insulin, fasting; Future    Elevated blood sugar    Orders:    Zinc; Future    Magnesium; Future    C-reactive protein; Future    Hemoglobin A1C; Future    Insulin, fasting; Future    Comprehensive metabolic panel; Future    Testosterone, free, total; Future    DHEA-sulfate; Future    Progesterone; Future    Estradiol; Future    FSH and LH; Future    Insulin, fasting; Future    Pain in other joint    Orders:    Zinc; Future    Magnesium; Future    C-reactive protein; Future    Hemoglobin A1C; Future    Insulin, fasting; Future    Comprehensive metabolic panel; Future    Testosterone, free, total; Future    DHEA-sulfate; Future    Progesterone; Future    Estradiol; Future    FSH and LH; Future     Insulin, fasting; Future         History of Present Illness     Here for acne  Mom had acne and had endometriosis  Patient is having painful menses, irregular menses  Would like lab testing  Has appt in November for dermatology  To see gyn in August  Body aches and joint pains  Cystic acne        Review of Systems   Constitutional:  Positive for fatigue. Negative for fever.   HENT:  Negative for congestion, postnasal drip and rhinorrhea.    Eyes:  Negative for photophobia and visual disturbance.   Respiratory:  Negative for cough, shortness of breath and wheezing.    Cardiovascular:  Negative for chest pain and palpitations.   Gastrointestinal:  Negative for constipation, diarrhea, nausea and vomiting.   Genitourinary:  Negative for dysuria and frequency.   Musculoskeletal:  Positive for arthralgias and myalgias.   Neurological:  Negative for dizziness, light-headedness and headaches.   Hematological:  Negative for adenopathy.   Psychiatric/Behavioral:  Negative for dysphoric mood and sleep disturbance. The patient is not nervous/anxious.      Past Medical History[1]  Past Surgical History[2]  Family History[3]  Social History[4]  Medications[5]  Allergies   Allergen Reactions    Procaine Rash     Immunization History   Administered Date(s) Administered    COVID-19 PFIZER VACCINE 0.3 ML IM 07/20/2021, 08/10/2021    COVID-19 Pfizer vac (Juan-sucrose, gray cap) 12 yr+ IM 02/22/2022    DTaP 5 2004, 2004, 01/11/2005, 09/22/2005, 04/29/2010    Hep B, adult 2004, 2004, 04/01/2015    Hib (PRP-OMP) 2004, 2004, 01/11/2005, 06/30/2005    IPV 2004, 2004, 09/22/2005, 04/29/2010    MMR 06/30/2005, 05/17/2010    Meningococcal MCV4, Unspecified 05/01/2017    Meningococcal MCV4P 06/19/2020    Meningococcal, Unknown Serogroups 05/01/2017    Pneumococcal Polysaccharide PPV23 2004, 2004, 04/11/2005, 06/30/2005    Tdap 05/01/2017    Varicella 09/22/2005, 01/14/2012  "    Objective   /70 (BP Location: Left arm, Patient Position: Sitting, Cuff Size: Standard)   Pulse 95   Temp (!) 97.2 °F (36.2 °C) (Tympanic)   Resp 17   Ht 5' 6\" (1.676 m)   Wt 64 kg (141 lb)   SpO2 98%   BMI 22.76 kg/m²     Physical Exam  Vitals and nursing note reviewed.   Constitutional:       Appearance: Normal appearance.   HENT:      Head: Normocephalic and atraumatic.     Eyes:      Conjunctiva/sclera: Conjunctivae normal.     Pulmonary:      Effort: Pulmonary effort is normal.     Musculoskeletal:         General: Normal range of motion.      Cervical back: Normal range of motion.     Neurological:      Mental Status: She is alert and oriented to person, place, and time.     Psychiatric:         Mood and Affect: Mood normal.         Behavior: Behavior normal.                [1]   Past Medical History:  Diagnosis Date    Headache     Ovarian cyst    [2]   Past Surgical History:  Procedure Laterality Date    FL INJECTION LEFT SHOULDER (ARTHROGRAM)  12/27/2023    [place] SURGICAL ARTHROSCOPY SHOULDER REPAIR SLAP LESION Left 2/2/2024    Procedure: ARTHROSCOPY SHOULDER- left Diagnostic;  Surgeon: Sylvia Clayton DO;  Location:  MAIN OR;  Service: Orthopedics    WISDOM TOOTH EXTRACTION Bilateral     bottom wisdom teeth removed   [3]   Family History  Problem Relation Name Age of Onset    Anxiety disorder Mother Migdalia     Endometriosis Mother Migdalia     Migraines Mother Migdalia     Seizures Mother Migdalia     Chiari malformation Mother Migdalia     No Known Problems Father      Anxiety disorder Sister Kallyn     Irritable bowel syndrome Sister Kallyn     Diabetes Maternal Grandmother Leilani     Hypertension Maternal Grandfather Jason     Diabetes Paternal Grandmother Nereyda     Mental illness Neg Hx      Addiction problem Neg Hx     [4]   Social History  Tobacco Use    Smoking status: Never     Passive exposure: Never    Smokeless tobacco: Never   Vaping Use    Vaping status: Never Used   Substance and Sexual Activity "    Alcohol use: Not Currently    Drug use: Never    Sexual activity: Never     Partners: Male   [5]   Current Outpatient Medications on File Prior to Visit   Medication Sig    diclofenac sodium (VOLTAREN) 25 MG EC tablet Take 1 tablet (25 mg total) by mouth 2 (two) times a day    Multiple Vitamin (multivitamin) capsule Take 1 capsule by mouth in the morning.    Sodium Fluoride 5000 PPM 1.1 % PSTE     [DISCONTINUED] cyclobenzaprine (FLEXERIL) 10 mg tablet Take 1 tablet (10 mg total) by mouth 2 (two) times a day as needed for muscle spasms May make you drowsy or dizzy. Do not drive or operate machinery until you know how this medication affects you, as it may cause lethargy and mental cloudiness. Drive with caution. May cause blurred vision. Avoid alcohol/other drugs that make you sleepy (Patient not taking: Reported on 7/14/2025)    [DISCONTINUED] methylPREDNISolone 4 MG tablet therapy pack Use as directed on package (Patient not taking: Reported on 4/15/2025)

## 2025-07-14 NOTE — ASSESSMENT & PLAN NOTE
Orders:    Zinc; Future    Magnesium; Future    C-reactive protein; Future    Hemoglobin A1C; Future    Insulin, fasting; Future    Comprehensive metabolic panel; Future    Testosterone, free, total; Future    DHEA-sulfate; Future    Progesterone; Future    Estradiol; Future    FSH and LH; Future    Insulin, fasting; Future    
Statement Selected

## 2025-07-15 ENCOUNTER — APPOINTMENT (OUTPATIENT)
Dept: LAB | Facility: MEDICAL CENTER | Age: 20
End: 2025-07-15
Attending: NURSE PRACTITIONER
Payer: COMMERCIAL

## 2025-07-15 DIAGNOSIS — R53.83 OTHER FATIGUE: ICD-10-CM

## 2025-07-15 DIAGNOSIS — L70.9 ACNE, UNSPECIFIED ACNE TYPE: ICD-10-CM

## 2025-07-15 DIAGNOSIS — M25.59 PAIN IN OTHER JOINT: ICD-10-CM

## 2025-07-15 DIAGNOSIS — N92.6 IRREGULAR MENSES: ICD-10-CM

## 2025-07-15 DIAGNOSIS — R73.9 ELEVATED BLOOD SUGAR: ICD-10-CM

## 2025-07-15 LAB
ALBUMIN SERPL BCG-MCNC: 4.3 G/DL (ref 3.5–5)
ALP SERPL-CCNC: 49 U/L (ref 34–104)
ALT SERPL W P-5'-P-CCNC: 10 U/L (ref 7–52)
ANION GAP SERPL CALCULATED.3IONS-SCNC: 7 MMOL/L (ref 4–13)
AST SERPL W P-5'-P-CCNC: 18 U/L (ref 13–39)
BILIRUB SERPL-MCNC: 0.48 MG/DL (ref 0.2–1)
BUN SERPL-MCNC: 16 MG/DL (ref 5–25)
CALCIUM SERPL-MCNC: 9.2 MG/DL (ref 8.4–10.2)
CHLORIDE SERPL-SCNC: 104 MMOL/L (ref 96–108)
CO2 SERPL-SCNC: 28 MMOL/L (ref 21–32)
CREAT SERPL-MCNC: 0.81 MG/DL (ref 0.6–1.3)
CRP SERPL QL: <1 MG/L
EST. AVERAGE GLUCOSE BLD GHB EST-MCNC: 117 MG/DL
ESTRADIOL SERPL-MCNC: 19.7 PG/ML
FSH SERPL-ACNC: 5.9 MIU/ML
GFR SERPL CREATININE-BSD FRML MDRD: 104 ML/MIN/1.73SQ M
GLUCOSE P FAST SERPL-MCNC: 90 MG/DL (ref 65–99)
HBA1C MFR BLD: 5.7 %
INSULIN SERPL-ACNC: 7.47 UIU/ML (ref 1.9–23)
LH SERPL-ACNC: 7.3 MIU/ML
MAGNESIUM SERPL-MCNC: 2.2 MG/DL (ref 1.9–2.7)
POTASSIUM SERPL-SCNC: 4.1 MMOL/L (ref 3.5–5.3)
PROGEST SERPL-MCNC: 1.42 NG/ML
PROT SERPL-MCNC: 7.1 G/DL (ref 6.4–8.4)
SODIUM SERPL-SCNC: 139 MMOL/L (ref 135–147)

## 2025-07-15 PROCEDURE — 84630 ASSAY OF ZINC: CPT

## 2025-07-15 PROCEDURE — 84144 ASSAY OF PROGESTERONE: CPT

## 2025-07-15 PROCEDURE — 83036 HEMOGLOBIN GLYCOSYLATED A1C: CPT

## 2025-07-15 PROCEDURE — 83002 ASSAY OF GONADOTROPIN (LH): CPT

## 2025-07-15 PROCEDURE — 82627 DEHYDROEPIANDROSTERONE: CPT

## 2025-07-15 PROCEDURE — 84403 ASSAY OF TOTAL TESTOSTERONE: CPT

## 2025-07-15 PROCEDURE — 83525 ASSAY OF INSULIN: CPT

## 2025-07-15 PROCEDURE — 82670 ASSAY OF TOTAL ESTRADIOL: CPT

## 2025-07-15 PROCEDURE — 86140 C-REACTIVE PROTEIN: CPT

## 2025-07-15 PROCEDURE — 83001 ASSAY OF GONADOTROPIN (FSH): CPT

## 2025-07-15 PROCEDURE — 36415 COLL VENOUS BLD VENIPUNCTURE: CPT

## 2025-07-15 PROCEDURE — 84402 ASSAY OF FREE TESTOSTERONE: CPT

## 2025-07-15 PROCEDURE — 80053 COMPREHEN METABOLIC PANEL: CPT

## 2025-07-15 PROCEDURE — 83735 ASSAY OF MAGNESIUM: CPT

## 2025-07-16 LAB — DHEA-S SERPL-MCNC: 106 UG/DL (ref 110–431.7)

## 2025-07-17 LAB
TESTOST FREE SERPL-MCNC: 1.1 PG/ML (ref 0–4.2)
TESTOST SERPL-MCNC: 24 NG/DL (ref 13–71)

## 2025-07-18 LAB — ZINC SERPL-MCNC: 74 UG/DL (ref 44–115)

## 2025-08-04 ENCOUNTER — OFFICE VISIT (OUTPATIENT)
Dept: PAIN MEDICINE | Facility: CLINIC | Age: 20
End: 2025-08-04
Payer: COMMERCIAL

## 2025-08-04 ENCOUNTER — ANNUAL EXAM (OUTPATIENT)
Dept: OBGYN CLINIC | Facility: CLINIC | Age: 20
End: 2025-08-04
Payer: COMMERCIAL

## 2025-08-04 VITALS
DIASTOLIC BLOOD PRESSURE: 64 MMHG | SYSTOLIC BLOOD PRESSURE: 110 MMHG | WEIGHT: 140 LBS | HEIGHT: 66 IN | BODY MASS INDEX: 22.5 KG/M2

## 2025-08-04 VITALS — BODY MASS INDEX: 21.86 KG/M2 | WEIGHT: 136 LBS | HEIGHT: 66 IN

## 2025-08-04 DIAGNOSIS — M54.50 ACUTE BILATERAL LOW BACK PAIN WITHOUT SCIATICA: Primary | ICD-10-CM

## 2025-08-04 DIAGNOSIS — Z01.419 WELL WOMAN EXAM WITH ROUTINE GYNECOLOGICAL EXAM: Primary | ICD-10-CM

## 2025-08-04 DIAGNOSIS — Z12.4 ENCOUNTER FOR SCREENING FOR MALIGNANT NEOPLASM OF CERVIX: ICD-10-CM

## 2025-08-04 PROBLEM — N92.6 IRREGULAR MENSES: Status: RESOLVED | Noted: 2025-07-14 | Resolved: 2025-08-04

## 2025-08-04 PROCEDURE — G0145 SCR C/V CYTO,THINLAYER,RESCR: HCPCS | Performed by: PHYSICIAN ASSISTANT

## 2025-08-04 PROCEDURE — 99212 OFFICE O/P EST SF 10 MIN: CPT | Performed by: PAIN MEDICINE

## 2025-08-04 PROCEDURE — S0612 ANNUAL GYNECOLOGICAL EXAMINA: HCPCS | Performed by: PHYSICIAN ASSISTANT

## 2025-08-08 LAB
LAB AP GYN PRIMARY INTERPRETATION: NORMAL
Lab: NORMAL

## (undated) DEVICE — BURR  OVAL 4MM 13CM 8 FLUTE COOLCUT

## (undated) DEVICE — DRESSING MEPILEX AG BORDER POST-OP 4 X 8 IN

## (undated) DEVICE — GLOVE SRG BIOGEL 8

## (undated) DEVICE — MAYO STAND COVER: Brand: CONVERTORS

## (undated) DEVICE — SYRINGE 10ML LL

## (undated) DEVICE — SUT ETHILON 3-0 PS-1 18 IN 1663G

## (undated) DEVICE — TUBING ARTHROSCOPY REDUCE PATIENT

## (undated) DEVICE — INTENDED FOR TISSUE SEPARATION, AND OTHER PROCEDURES THAT REQUIRE A SHARP SURGICAL BLADE TO PUNCTURE OR CUT.: Brand: BARD-PARKER ® CARBON RIB-BACK BLADES

## (undated) DEVICE — DISPOSABLE EQUIPMENT COVER: Brand: SMALL TOWEL DRAPE

## (undated) DEVICE — ASTOUND STANDARD SURGICAL GOWN, XL: Brand: CONVERTORS

## (undated) DEVICE — 3M™ IOBAN™ 2 ANTIMICROBIAL INCISE DRAPE 6650EZ: Brand: IOBAN™ 2

## (undated) DEVICE — GLOVE INDICATOR PI UNDERGLOVE SZ 8 BLUE

## (undated) DEVICE — SLEEVE ARM SHOULDER SUSPENSION SYS

## (undated) DEVICE — GLOVE SRG BIOGEL 7.5

## (undated) DEVICE — LIGHT HANDLE COVER SLEEVE DISP BLUE STELLAR

## (undated) DEVICE — CANNULA 7 X70MM THRD SEAL SIDE PORT

## (undated) DEVICE — TUBING ARTHROSCOPY REDUCE PUMP

## (undated) DEVICE — DRESSING MEPILEX AG BORDER POST-OP 4 X 6 IN

## (undated) DEVICE — CHLORAPREP HI-LITE 10.5ML ORANGE

## (undated) DEVICE — SLING TRACTION LATERAL S3

## (undated) DEVICE — BLADE SHAVER TORPEDO 4MM 13CM  COOLCUT

## (undated) DEVICE — ARTHROSCOPY FLOOR MAT

## (undated) DEVICE — IMPERVIOUS STOCKINETTE: Brand: DEROYAL

## (undated) DEVICE — PACK PBDS SHOULDER ARTHROSCOPY RF

## (undated) DEVICE — PROBE ABLATION  APOLLORF 90 DEG MULTI PORT

## (undated) DEVICE — TUBING SUCTION 5MM X 12 FT